# Patient Record
Sex: MALE | Race: WHITE | NOT HISPANIC OR LATINO | Employment: OTHER | URBAN - METROPOLITAN AREA
[De-identification: names, ages, dates, MRNs, and addresses within clinical notes are randomized per-mention and may not be internally consistent; named-entity substitution may affect disease eponyms.]

---

## 2018-01-03 ENCOUNTER — TRANSCRIBE ORDERS (OUTPATIENT)
Dept: ADMINISTRATIVE | Facility: HOSPITAL | Age: 63
End: 2018-01-03

## 2018-01-03 DIAGNOSIS — I71.20 THORACIC AORTIC ANEURYSM WITHOUT RUPTURE (HCC): Primary | ICD-10-CM

## 2018-01-08 ENCOUNTER — TRANSCRIBE ORDERS (OUTPATIENT)
Dept: ADMINISTRATIVE | Facility: HOSPITAL | Age: 63
End: 2018-01-08

## 2018-01-08 DIAGNOSIS — I71.21 ANEURYSM, ASCENDING AORTA (HCC): Primary | ICD-10-CM

## 2018-01-11 ENCOUNTER — HOSPITAL ENCOUNTER (OUTPATIENT)
Dept: CT IMAGING | Facility: HOSPITAL | Age: 63
Discharge: HOME | End: 2018-01-11
Attending: INTERNAL MEDICINE | Admitting: INTERNAL MEDICINE

## 2018-01-11 DIAGNOSIS — I71.21 ANEURYSM, ASCENDING AORTA (HCC): ICD-10-CM

## 2018-01-11 DIAGNOSIS — I71.20 THORACIC AORTIC ANEURYSM WITHOUT RUPTURE (HCC): ICD-10-CM

## 2018-01-11 LAB — CREAT BLDA-MCNC: 1.2 MG/DL (ref 0.6–1.3)

## 2018-01-11 PROCEDURE — 71275 CT ANGIOGRAPHY CHEST: CPT

## 2018-01-11 PROCEDURE — 82565 ASSAY OF CREATININE: CPT

## 2018-01-11 PROCEDURE — 0 IOPAMIDOL PER 1 ML: Performed by: INTERNAL MEDICINE

## 2018-01-11 RX ADMIN — IOPAMIDOL 95 ML: 755 INJECTION, SOLUTION INTRAVENOUS at 09:39

## 2018-01-29 ENCOUNTER — TRANSCRIBE ORDERS (OUTPATIENT)
Dept: ADMINISTRATIVE | Facility: HOSPITAL | Age: 63
End: 2018-01-29

## 2018-01-29 DIAGNOSIS — R93.89 ABNORMAL CHEST CT: Primary | ICD-10-CM

## 2018-04-11 ENCOUNTER — HOSPITAL ENCOUNTER (OUTPATIENT)
Dept: CT IMAGING | Facility: HOSPITAL | Age: 63
Discharge: HOME | End: 2018-04-11
Attending: INTERNAL MEDICINE | Admitting: INTERNAL MEDICINE

## 2018-04-11 DIAGNOSIS — R93.89 ABNORMAL CHEST CT: ICD-10-CM

## 2018-04-11 PROCEDURE — 71250 CT THORAX DX C-: CPT

## 2018-04-20 ENCOUNTER — TRANSCRIBE ORDERS (OUTPATIENT)
Dept: ADMINISTRATIVE | Facility: HOSPITAL | Age: 63
End: 2018-04-20

## 2018-04-20 DIAGNOSIS — R91.1 PULMONARY NODULE: Primary | ICD-10-CM

## 2018-11-05 ENCOUNTER — HOSPITAL ENCOUNTER (OUTPATIENT)
Dept: GENERAL RADIOLOGY | Facility: HOSPITAL | Age: 63
Discharge: HOME | End: 2018-11-05
Attending: INTERNAL MEDICINE | Admitting: INTERNAL MEDICINE

## 2018-11-05 DIAGNOSIS — M19.071 ARTHRITIS OF RIGHT ANKLE: ICD-10-CM

## 2018-11-05 PROCEDURE — 73610 X-RAY EXAM OF ANKLE: CPT

## 2018-11-07 ENCOUNTER — TRANSCRIBE ORDERS (OUTPATIENT)
Dept: ADMINISTRATIVE | Facility: HOSPITAL | Age: 63
End: 2018-11-07

## 2018-11-07 DIAGNOSIS — M19.90 ARTHRITIS: Primary | ICD-10-CM

## 2018-11-08 ENCOUNTER — HOSPITAL ENCOUNTER (OUTPATIENT)
Dept: GENERAL RADIOLOGY | Facility: HOSPITAL | Age: 63
Discharge: HOME | End: 2018-11-08
Attending: INTERNAL MEDICINE | Admitting: RADIOLOGY

## 2018-11-08 DIAGNOSIS — M19.90 ARTHRITIS: ICD-10-CM

## 2018-11-08 LAB
APPEARANCE FLD: ABNORMAL
COLOR FLD: ABNORMAL
CRYSTALS FLD MICRO: NORMAL
LYMPHOCYTES NFR FLD MANUAL: 70 %
MONOCYTES NFR FLD: 4 %
NEUTROPHILS NFR FLD MANUAL: 26 %
RBC # FLD AUTO: 4050 /MM3
WBC # FLD: 18 /MM3

## 2018-11-08 PROCEDURE — 87015 SPECIMEN INFECT AGNT CONCNTJ: CPT | Performed by: INTERNAL MEDICINE

## 2018-11-08 PROCEDURE — 77002 NEEDLE LOCALIZATION BY XRAY: CPT

## 2018-11-08 PROCEDURE — 89051 BODY FLUID CELL COUNT: CPT | Performed by: INTERNAL MEDICINE

## 2018-11-08 PROCEDURE — 89060 EXAM SYNOVIAL FLUID CRYSTALS: CPT | Performed by: INTERNAL MEDICINE

## 2018-11-08 PROCEDURE — 87070 CULTURE OTHR SPECIMN AEROBIC: CPT | Performed by: INTERNAL MEDICINE

## 2018-11-08 PROCEDURE — 87205 SMEAR GRAM STAIN: CPT | Performed by: INTERNAL MEDICINE

## 2018-11-08 RX ORDER — LIDOCAINE HYDROCHLORIDE 10 MG/ML
10 INJECTION, SOLUTION INFILTRATION; PERINEURAL ONCE
Status: COMPLETED | OUTPATIENT
Start: 2018-11-08 | End: 2018-11-08

## 2018-11-08 RX ADMIN — LIDOCAINE HYDROCHLORIDE 5 ML: 10 INJECTION, SOLUTION INFILTRATION; PERINEURAL at 10:58

## 2018-11-13 LAB
BACTERIA FLD CULT: NORMAL
GRAM STN SPEC: NORMAL

## 2018-12-19 ENCOUNTER — TRANSCRIBE ORDERS (OUTPATIENT)
Dept: ADMINISTRATIVE | Facility: HOSPITAL | Age: 63
End: 2018-12-19

## 2018-12-19 DIAGNOSIS — R91.1 LUNG NODULE: Primary | ICD-10-CM

## 2018-12-28 ENCOUNTER — HOSPITAL ENCOUNTER (OUTPATIENT)
Dept: CT IMAGING | Facility: HOSPITAL | Age: 63
Discharge: HOME | End: 2018-12-28
Attending: INTERNAL MEDICINE | Admitting: INTERNAL MEDICINE

## 2018-12-28 DIAGNOSIS — R91.1 LUNG NODULE: ICD-10-CM

## 2018-12-28 PROCEDURE — 71250 CT THORAX DX C-: CPT

## 2019-01-28 ENCOUNTER — TRANSCRIBE ORDERS (OUTPATIENT)
Dept: ADMINISTRATIVE | Facility: HOSPITAL | Age: 64
End: 2019-01-28

## 2019-01-28 DIAGNOSIS — R91.1 PULMONARY NODULE, LEFT: Primary | ICD-10-CM

## 2019-06-20 ENCOUNTER — HOSPITAL ENCOUNTER (OUTPATIENT)
Dept: CT IMAGING | Facility: HOSPITAL | Age: 64
Discharge: HOME | End: 2019-06-20
Admitting: INTERNAL MEDICINE

## 2019-06-20 DIAGNOSIS — R91.1 PULMONARY NODULE, LEFT: ICD-10-CM

## 2019-06-20 PROCEDURE — 71250 CT THORAX DX C-: CPT

## 2019-08-14 ENCOUNTER — APPOINTMENT (OUTPATIENT)
Dept: OTHER | Facility: HOSPITAL | Age: 64
End: 2019-08-14

## 2019-08-14 ENCOUNTER — LAB (OUTPATIENT)
Dept: LAB | Facility: HOSPITAL | Age: 64
End: 2019-08-14

## 2019-08-14 ENCOUNTER — CONSULT (OUTPATIENT)
Dept: ONCOLOGY | Facility: CLINIC | Age: 64
End: 2019-08-14

## 2019-08-14 VITALS
BODY MASS INDEX: 19.82 KG/M2 | HEIGHT: 75 IN | WEIGHT: 159.4 LBS | HEART RATE: 58 BPM | RESPIRATION RATE: 16 BRPM | OXYGEN SATURATION: 98 % | TEMPERATURE: 98.4 F | DIASTOLIC BLOOD PRESSURE: 85 MMHG | SYSTOLIC BLOOD PRESSURE: 129 MMHG

## 2019-08-14 DIAGNOSIS — R79.89 ELEVATED FERRITIN: ICD-10-CM

## 2019-08-14 DIAGNOSIS — D69.6 THROMBOCYTOPENIA (HCC): Primary | ICD-10-CM

## 2019-08-14 LAB
BASOPHILS # BLD AUTO: 0.07 10*3/MM3 (ref 0–0.2)
BASOPHILS NFR BLD AUTO: 1.3 % (ref 0–1.5)
DEPRECATED RDW RBC AUTO: 44.6 FL (ref 37–54)
EOSINOPHIL # BLD AUTO: 0.34 10*3/MM3 (ref 0–0.4)
EOSINOPHIL NFR BLD AUTO: 6.4 % (ref 0.3–6.2)
ERYTHROCYTE [DISTWIDTH] IN BLOOD BY AUTOMATED COUNT: 12.6 % (ref 12.3–15.4)
FERRITIN SERPL-MCNC: 929 NG/ML (ref 30–400)
HCT VFR BLD AUTO: 46.5 % (ref 37.5–51)
HGB BLD-MCNC: 16.2 G/DL (ref 13–17.7)
HGB RETIC QN AUTO: 38.1 PG (ref 29.8–36.1)
IMM GRANULOCYTES # BLD AUTO: 0.05 10*3/MM3 (ref 0–0.05)
IMM GRANULOCYTES NFR BLD AUTO: 0.9 % (ref 0–0.5)
IMM RETICS NFR: 4.8 % (ref 3–15.8)
IRON 24H UR-MRATE: 201 MCG/DL (ref 59–158)
IRON SATN MFR SERPL: 83 % (ref 14–48)
LYMPHOCYTES # BLD AUTO: 1.89 10*3/MM3 (ref 0.7–3.1)
LYMPHOCYTES NFR BLD AUTO: 35.8 % (ref 19.6–45.3)
MCH RBC QN AUTO: 33.2 PG (ref 26.6–33)
MCHC RBC AUTO-ENTMCNC: 34.8 G/DL (ref 31.5–35.7)
MCV RBC AUTO: 95.3 FL (ref 79–97)
MONOCYTES # BLD AUTO: 0.52 10*3/MM3 (ref 0.1–0.9)
MONOCYTES NFR BLD AUTO: 9.8 % (ref 5–12)
NEUTROPHILS # BLD AUTO: 2.41 10*3/MM3 (ref 1.7–7)
NEUTROPHILS NFR BLD AUTO: 45.8 % (ref 42.7–76)
NRBC BLD AUTO-RTO: 0 /100 WBC (ref 0–0.2)
PLATELET # BLD AUTO: 124 10*3/MM3 (ref 140–450)
PLATELETS.RETICULATED NFR BLD AUTO: 8.7 % (ref 0.9–6.5)
PMV BLD AUTO: 11.4 FL (ref 6–12)
RBC # BLD AUTO: 4.88 10*6/MM3 (ref 4.14–5.8)
RETICS/RBC NFR AUTO: 0.98 % (ref 0.7–1.9)
TIBC SERPL-MCNC: 242 MCG/DL (ref 249–505)
TRANSFERRIN SERPL-MCNC: 173 MG/DL (ref 200–360)
VIT B12 BLD-MCNC: 304 PG/ML (ref 211–946)
WBC NRBC COR # BLD: 5.28 10*3/MM3 (ref 3.4–10.8)

## 2019-08-14 PROCEDURE — 85046 RETICYTE/HGB CONCENTRATE: CPT | Performed by: INTERNAL MEDICINE

## 2019-08-14 PROCEDURE — 85055 RETICULATED PLATELET ASSAY: CPT | Performed by: INTERNAL MEDICINE

## 2019-08-14 PROCEDURE — 36415 COLL VENOUS BLD VENIPUNCTURE: CPT | Performed by: INTERNAL MEDICINE

## 2019-08-14 PROCEDURE — 99205 OFFICE O/P NEW HI 60 MIN: CPT | Performed by: INTERNAL MEDICINE

## 2019-08-14 PROCEDURE — 82728 ASSAY OF FERRITIN: CPT | Performed by: INTERNAL MEDICINE

## 2019-08-14 PROCEDURE — 84466 ASSAY OF TRANSFERRIN: CPT | Performed by: INTERNAL MEDICINE

## 2019-08-14 PROCEDURE — 83540 ASSAY OF IRON: CPT | Performed by: INTERNAL MEDICINE

## 2019-08-14 PROCEDURE — 85025 COMPLETE CBC W/AUTO DIFF WBC: CPT | Performed by: INTERNAL MEDICINE

## 2019-08-14 PROCEDURE — 82607 VITAMIN B-12: CPT | Performed by: INTERNAL MEDICINE

## 2019-08-14 RX ORDER — METOPROLOL SUCCINATE 50 MG/1
50 TABLET, EXTENDED RELEASE ORAL DAILY
Refills: 1 | COMMUNITY
Start: 2019-05-15

## 2019-08-14 NOTE — PROGRESS NOTES
REFERRING PROVIDER:    Wendi Hanson MD  [address] [address]    REASON FOR CONSULTATION:    1.  Thrombocytopenia  2.  Elevated ferritin    HISTORY OF PRESENT ILLNESS:  Pablito Bergman is a 64 y.o. male who is referred today for further evaluation of thrombocytopenia and a high ferritin level.    He has a history of smoking.  Based on this, he has had recurrent CT imaging of his chest.  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  CT imaging of his chest from 6/20/2019 showed that his liver is more dense than his spleen.  This was previously present as well.  Based on this, there was some concern for the possibility of liver disease and hemochromatosis.    Labs on 6/18/2019 showed a platelet count of 115,000, hemoglobin 16.0 hematocrit 48.1%, and white blood cells 4.1 with a normal differential.  As of 6/27/2019 his ferritin was 866 with an iron of 135 and 63% iron saturation.        Previously, on 11/5/2018 his platelets were 117,000 with a hemoglobin of 16.0 and hematocrit 46.1%.    He is asymptomatic.  He continues to smoke.  He denies significant alcohol use.  He denies significant pain or inflammation.  He denies any family history of hemochromatosis.    Past Medical History:   Diagnosis Date   • Headache    • Hypertension    • Kidney stone    • Left inguinal hernia    • Thoracic aortic aneurysm (CMS/HCC)        Past Surgical History:   Procedure Laterality Date   • COLONOSCOPY     • INGUINAL HERNIA REPAIR Left 2018   • KIDNEY STONE SURGERY     • KNEE SURGERY Bilateral 1987   • SKIN BIOPSY         SOCIAL HISTORY:   reports that he has been smoking cigarettes.  He has been smoking about 0.75 packs per day. He has never used smokeless tobacco. He reports that he drinks alcohol. His drug history is not on file.    FAMILY HISTORY:  family history includes Cancer in his maternal grandmother; Prostate cancer in his maternal grandfather.    ALLERGIES:  Allergies   Allergen  "Reactions   • Other Unknown (See Comments)     Red pepper headache reaction       MEDICATIONS:  The medication list has been reviewed with the patient by the medical assistant, and the list has been updated in the electronic medical record, which I reviewed.  Medication dosages and frequencies were confirmed to be accurate.    Review of Systems   Constitutional: Negative for appetite change, chills, fatigue, fever and unexpected weight change.   HENT: Negative for congestion, dental problem, ear pain, hearing loss, mouth sores, nosebleeds, postnasal drip, rhinorrhea, tinnitus and trouble swallowing.    Eyes: Negative.    Respiratory: Negative for cough, chest tightness, shortness of breath, wheezing and stridor.    Cardiovascular: Negative for chest pain, palpitations and leg swelling.   Gastrointestinal: Negative for abdominal distention, abdominal pain, blood in stool, constipation, diarrhea, nausea and vomiting.   Endocrine: Negative.    Genitourinary: Negative for decreased urine volume, difficulty urinating, dysuria, flank pain, frequency, hematuria, scrotal swelling, testicular pain and urgency.   Musculoskeletal: Negative for arthralgias, back pain and joint swelling.   Allergic/Immunologic: Negative.    Neurological: Negative for dizziness, seizures, syncope, weakness, light-headedness, numbness and headaches.   Hematological: Negative for adenopathy. Does not bruise/bleed easily.   Psychiatric/Behavioral: Negative for confusion and sleep disturbance. The patient is not nervous/anxious.    All other systems reviewed and are negative.      Vitals:    08/14/19 1450   BP: 129/85   Pulse: 58   Resp: 16   Temp: 98.4 °F (36.9 °C)   TempSrc: Oral   SpO2: 98%   Weight: 72.3 kg (159 lb 6.4 oz)   Height: 190 cm (74.8\")   PainSc: 0-No pain  Comment: thrombocytopenia       Physical Exam   Constitutional: He is oriented to person, place, and time. He appears well-developed and well-nourished. No distress.   HENT:   Head: " Normocephalic and atraumatic. Hair is normal.   Mouth/Throat: Oropharynx is clear and moist.   Eyes: Conjunctivae, EOM and lids are normal. Pupils are equal.   Neck: Neck supple. No JVD present. No thyroid mass and no thyromegaly present.   Cardiovascular: Normal rate and regular rhythm. Exam reveals no gallop and no friction rub.   No murmur heard.  Pulmonary/Chest: Effort normal and breath sounds normal. No respiratory distress. He has no wheezes. He has no rales.   Abdominal: Soft. He exhibits no distension and no mass. There is no splenomegaly or hepatomegaly. There is no tenderness. There is no guarding.   Musculoskeletal: Normal range of motion. He exhibits no edema, tenderness or deformity.   Lymphadenopathy:     He has no cervical adenopathy.     He has no axillary adenopathy.        Right: No inguinal and no supraclavicular adenopathy present.        Left: No inguinal and no supraclavicular adenopathy present.   Neurological: He is alert and oriented to person, place, and time. He has normal strength.   Skin: Skin is warm and dry. No rash noted.   Psychiatric: He has a normal mood and affect. His behavior is normal. Judgment and thought content normal. Cognition and memory are normal.   Nursing note and vitals reviewed.      DIAGNOSTIC DATA:  Results for orders placed or performed in visit on 08/14/19   CBC Auto Differential   Result Value Ref Range    WBC 5.28 3.40 - 10.80 10*3/mm3    RBC 4.88 4.14 - 5.80 10*6/mm3    Hemoglobin 16.2 13.0 - 17.7 g/dL    Hematocrit 46.5 37.5 - 51.0 %    MCV 95.3 79.0 - 97.0 fL    MCH 33.2 (H) 26.6 - 33.0 pg    MCHC 34.8 31.5 - 35.7 g/dL    RDW 12.6 12.3 - 15.4 %    RDW-SD 44.6 37.0 - 54.0 fl    MPV 11.4 6.0 - 12.0 fL    Platelets 124 (L) 140 - 450 10*3/mm3    Neutrophil % 45.8 42.7 - 76.0 %    Lymphocyte % 35.8 19.6 - 45.3 %    Monocyte % 9.8 5.0 - 12.0 %    Eosinophil % 6.4 (H) 0.3 - 6.2 %    Basophil % 1.3 0.0 - 1.5 %    Immature Grans % 0.9 (H) 0.0 - 0.5 %    Neutrophils,  Absolute 2.41 1.70 - 7.00 10*3/mm3    Lymphocytes, Absolute 1.89 0.70 - 3.10 10*3/mm3    Monocytes, Absolute 0.52 0.10 - 0.90 10*3/mm3    Eosinophils, Absolute 0.34 0.00 - 0.40 10*3/mm3    Basophils, Absolute 0.07 0.00 - 0.20 10*3/mm3    Immature Grans, Absolute 0.05 0.00 - 0.05 10*3/mm3    nRBC 0.0 0.0 - 0.2 /100 WBC       IMAGING:    I personally reviewed his peripheral blood smear.  I do not visualize any platelet clumping.  Platelets are adequate in number and normal in morphology.  Red cells appear normal.  White cells are normal in morphology.    ASSESSMENT:  This is a 64 y.o. male with:  1.  Thrombocytopenia: This has been chronic.  I do not see any evidence for platelet clumping on his peripheral blood smear.  He is asymptomatic.  We will check an immature platelet fraction and a vitamin B12 level.    2.  Elevated ferritin:  As of 6/27/2019 his ferritin was 866 with an iron of 135 and 63% iron saturation.  There is no family history of this, but he certainly could have a mild form of hereditary hemochromatosis.  We will check an HFE gene mutation analysis today and repeat his ferritin and iron studies.  I explained to him today a little bit about hemochromatosis.  If he does have evidence for this, we will plan to treat with therapeutic phlebotomy.  He seems to have a mild form of this if he does have hemochromatosis.    PLAN:   1.  Labs today including an immature platelet fraction, vitamin B12 level, ferritin, iron profile, reticulocyte count, and HFE gene mutation analysis.    2.  I will see him back in the next few weeks for follow-up with a CBC and an appointment for for therapeutic phlebotomy if appropriate.

## 2019-08-19 LAB — HFE GENE MUT ANL BLD/T: NORMAL

## 2019-09-11 ENCOUNTER — OFFICE VISIT (OUTPATIENT)
Dept: ONCOLOGY | Facility: CLINIC | Age: 64
End: 2019-09-11

## 2019-09-11 ENCOUNTER — INFUSION (OUTPATIENT)
Dept: ONCOLOGY | Facility: HOSPITAL | Age: 64
End: 2019-09-11

## 2019-09-11 ENCOUNTER — LAB (OUTPATIENT)
Dept: LAB | Facility: HOSPITAL | Age: 64
End: 2019-09-11

## 2019-09-11 VITALS
RESPIRATION RATE: 16 BRPM | HEART RATE: 60 BPM | DIASTOLIC BLOOD PRESSURE: 96 MMHG | HEIGHT: 75 IN | OXYGEN SATURATION: 97 % | TEMPERATURE: 97.8 F | WEIGHT: 165.7 LBS | SYSTOLIC BLOOD PRESSURE: 147 MMHG | BODY MASS INDEX: 20.6 KG/M2

## 2019-09-11 VITALS — DIASTOLIC BLOOD PRESSURE: 88 MMHG | HEART RATE: 57 BPM | SYSTOLIC BLOOD PRESSURE: 138 MMHG

## 2019-09-11 DIAGNOSIS — D69.6 THROMBOCYTOPENIA (HCC): ICD-10-CM

## 2019-09-11 DIAGNOSIS — R79.89 ELEVATED FERRITIN: ICD-10-CM

## 2019-09-11 DIAGNOSIS — E83.110 HEREDITARY HEMOCHROMATOSIS (HCC): Primary | ICD-10-CM

## 2019-09-11 LAB
BASOPHILS # BLD AUTO: 0.07 10*3/MM3 (ref 0–0.2)
BASOPHILS NFR BLD AUTO: 1.4 % (ref 0–1.5)
DEPRECATED RDW RBC AUTO: 45.8 FL (ref 37–54)
EOSINOPHIL # BLD AUTO: 0.37 10*3/MM3 (ref 0–0.4)
EOSINOPHIL NFR BLD AUTO: 7.6 % (ref 0.3–6.2)
ERYTHROCYTE [DISTWIDTH] IN BLOOD BY AUTOMATED COUNT: 12.7 % (ref 12.3–15.4)
HCT VFR BLD AUTO: 47.3 % (ref 37.5–51)
HGB BLD-MCNC: 16.1 G/DL (ref 13–17.7)
IMM GRANULOCYTES # BLD AUTO: 0.05 10*3/MM3 (ref 0–0.05)
IMM GRANULOCYTES NFR BLD AUTO: 1 % (ref 0–0.5)
LYMPHOCYTES # BLD AUTO: 1.38 10*3/MM3 (ref 0.7–3.1)
LYMPHOCYTES NFR BLD AUTO: 28.5 % (ref 19.6–45.3)
MCH RBC QN AUTO: 33.1 PG (ref 26.6–33)
MCHC RBC AUTO-ENTMCNC: 34 G/DL (ref 31.5–35.7)
MCV RBC AUTO: 97.1 FL (ref 79–97)
MONOCYTES # BLD AUTO: 0.49 10*3/MM3 (ref 0.1–0.9)
MONOCYTES NFR BLD AUTO: 10.1 % (ref 5–12)
NEUTROPHILS # BLD AUTO: 2.49 10*3/MM3 (ref 1.7–7)
NEUTROPHILS NFR BLD AUTO: 51.4 % (ref 42.7–76)
NRBC BLD AUTO-RTO: 0 /100 WBC (ref 0–0.2)
PLATELET # BLD AUTO: 105 10*3/MM3 (ref 140–450)
PMV BLD AUTO: 11.1 FL (ref 6–12)
RBC # BLD AUTO: 4.87 10*6/MM3 (ref 4.14–5.8)
WBC NRBC COR # BLD: 4.85 10*3/MM3 (ref 3.4–10.8)

## 2019-09-11 PROCEDURE — 85025 COMPLETE CBC W/AUTO DIFF WBC: CPT | Performed by: INTERNAL MEDICINE

## 2019-09-11 PROCEDURE — 99214 OFFICE O/P EST MOD 30 MIN: CPT | Performed by: INTERNAL MEDICINE

## 2019-09-11 PROCEDURE — 36415 COLL VENOUS BLD VENIPUNCTURE: CPT | Performed by: INTERNAL MEDICINE

## 2019-09-11 RX ORDER — SODIUM CHLORIDE 9 MG/ML
250 INJECTION, SOLUTION INTRAVENOUS ONCE
Status: CANCELLED
Start: 2019-09-18 | End: 2019-09-18

## 2019-09-11 RX ORDER — SODIUM CHLORIDE 9 MG/ML
250 INJECTION, SOLUTION INTRAVENOUS ONCE
Status: DISCONTINUED | OUTPATIENT
Start: 2019-09-11 | End: 2019-09-11 | Stop reason: HOSPADM

## 2019-09-11 NOTE — PROGRESS NOTES
[facility] GROUP OUTPATIENT FOLLOW UP CLINIC VISIT    REASON FOR FOLLOW-UP:    1.  Thrombocytopenia  2.  Elevated ferritin with newly diagnosed hereditary hemochromatosis on 8/14/2019.  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  Baseline ferritin in the office on 8/14/2019 is 929.       HISTORY OF PRESENT ILLNESS:  Pablito Bergman is a 64 y.o. male who returns today for follow up of the above issue.      He continues to feel well.  No new problems today.        HEMATOLOGIC HISTORY:  He has a history of smoking.  Based on this, he has had recurrent CT imaging of his chest.  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  CT imaging of his chest from 6/20/2019 showed that his liver is more dense than his spleen.  This was previously present as well.  Based on this, there was some concern for the possibility of liver disease and hemochromatosis.     Labs on 6/18/2019 showed a platelet count of 115,000, hemoglobin 16.0 hematocrit 48.1%, and white blood cells 4.1 with a normal differential.  As of 6/27/2019 his ferritin was 866 with an iron of 135 and 63% iron saturation.         Previously, on 11/5/2018 his platelets were 117,000 with a hemoglobin of 16.0 and hematocrit 46.1%.     He is asymptomatic.  He continues to smoke.  He denies significant alcohol use.  He denies significant pain or inflammation.  He denies any family history of hemochromatosis.    ALLERGIES:  Allergies   Allergen Reactions   • Other Unknown (See Comments)     Red pepper headache reaction       MEDICATIONS:  The medication list has been reviewed with the patient by the medical assistant, and the list has been updated in the electronic medical record, which I reviewed.  Medication dosages and frequencies were confirmed to be accurate.    REVIEW OF SYSTEMS:  PAIN:  See Vital Signs below.  GENERAL:  No fevers, chills, night sweats, or unintended weight loss.  SKIN:  No rashes or non-healing lesions.  HEME/LYMPH:  No  "abnormal bleeding.  No palpable lymphadenopathy.  EYES:  No vision changes or diplopia.  ENT:  No sore throat or difficulty swallowing.  RESPIRATORY:  No cough, shortness of breath, hemoptysis, or wheezing.  CARDIOVASCULAR:  No chest pain, palpitations, orthopnea, or dyspnea on exertion.  GASTROINTESTINAL:  No abdominal pain, nausea, vomiting, constipation, diarrhea, melena, or hematochezia.  GENITOURINARY:  No dysuria or hematuria.  MUSCULOSKELETAL:  No joint pain, swelling, or erythema.  NEUROLOGIC:  No dizziness, loss of consciousness, or seizures.  PSYCHIATRIC:  No depression, anxiety, or mood changes.    Vitals:    09/11/19 0846   BP: 147/96   Pulse: 60   Resp: 16   Temp: 97.8 °F (36.6 °C)   TempSrc: Oral   SpO2: 97%   Weight: 75.2 kg (165 lb 11.2 oz)   Height: 190 cm (74.8\")   PainSc: 0-No pain  Comment: skin cancer       PHYSICAL EXAMINATION:  GENERAL:  Well-developed well-nourished male; awake, alert and oriented, in no acute distress.  SKIN:  Warm and dry, without rashes, purpura, or petechiae.  HEAD:  Normocephalic, atraumatic.  EARS:  Hearing intact.  NOSE:  Septum midline.  No excoriations or nasal discharge.  MOUTH:  No stomatitis or ulcers.  Lips are normal.  THROAT:  Oropharynx without lesions or exudates.  LYMPHATICS:  No cervical, supraclavicular, or axillary lymphadenopathy.  CHEST:  Lungs are clear to auscultation bilaterally.  No wheezes, rales, or rhonchi.  HEART:  Regular rate; normal rhythm.  No murmurs, gallops or rubs.  EXTREMITIES:  No clubbing, cyanosis, or edema.  NEUROLOGICAL:  No focal neurologic deficits.    DIAGNOSTIC DATA:  Results for orders placed or performed in visit on 09/11/19   CBC Auto Differential   Result Value Ref Range    WBC 4.85 3.40 - 10.80 10*3/mm3    RBC 4.87 4.14 - 5.80 10*6/mm3    Hemoglobin 16.1 13.0 - 17.7 g/dL    Hematocrit 47.3 37.5 - 51.0 %    MCV 97.1 (H) 79.0 - 97.0 fL    MCH 33.1 (H) 26.6 - 33.0 pg    MCHC 34.0 31.5 - 35.7 g/dL    RDW 12.7 12.3 - 15.4 %    " RDW-SD 45.8 37.0 - 54.0 fl    MPV 11.1 6.0 - 12.0 fL    Platelets 105 (L) 140 - 450 10*3/mm3    Neutrophil % 51.4 42.7 - 76.0 %    Lymphocyte % 28.5 19.6 - 45.3 %    Monocyte % 10.1 5.0 - 12.0 %    Eosinophil % 7.6 (H) 0.3 - 6.2 %    Basophil % 1.4 0.0 - 1.5 %    Immature Grans % 1.0 (H) 0.0 - 0.5 %    Neutrophils, Absolute 2.49 1.70 - 7.00 10*3/mm3    Lymphocytes, Absolute 1.38 0.70 - 3.10 10*3/mm3    Monocytes, Absolute 0.49 0.10 - 0.90 10*3/mm3    Eosinophils, Absolute 0.37 0.00 - 0.40 10*3/mm3    Basophils, Absolute 0.07 0.00 - 0.20 10*3/mm3    Immature Grans, Absolute 0.05 0.00 - 0.05 10*3/mm3    nRBC 0.0 0.0 - 0.2 /100 WBC       IMAGING:     ASSESSMENT:  This is a 64 y.o. male with:  1. Thrombocytopenia: This has been chronic.  I do not see any evidence for platelet clumping on his peripheral blood smear.  He is asymptomatic.  His IPF is slightly elevated at 8.7%.  Vitamin B12 normal at 304.  Continue to monitor.  2. Hereditary hemochromatosis:  He is a homozygote for the HFE gene C282Y mutation.  Baseline ferritin in the office on 8/14/2019 is 929.  I gave him a copy of the lab work today and educated him regarding hemochromatosis.  I advised him that some people with his genotype are more affected than others.  Considering that his ferritin is only in the 900s at age 64 I do not think he has been accumulating iron that rapidly throughout his life.  I advised him that we need to get rid of the iron with therapeutic phlebotomy.  We will go ahead and schedule this monthly for now and follow his CBCs and ferritin levels.  I advised him that it is impossible to predict how quickly his ferritin might come down but I am hopeful that it will come down rapidly even with monthly phlebotomy.  If not, we will need to increase the frequency.  At some point we may obtain MRI imaging of his liver to look for iron deposition and we will need to check an alpha-fetoprotein level at some point as well.         PLAN:  1.   Therapeutic phlebotomy today and monthly with a CBC and ferritin at each visit.  I will see him back after the first of the year.  We will adjust his phlebotomy schedule appropriately based on his ferritin levels.  Goal ferritin is around 50.  We will monitor for anemia.

## 2019-10-09 ENCOUNTER — INFUSION (OUTPATIENT)
Dept: ONCOLOGY | Facility: HOSPITAL | Age: 64
End: 2019-10-09

## 2019-10-09 ENCOUNTER — LAB (OUTPATIENT)
Dept: LAB | Facility: HOSPITAL | Age: 64
End: 2019-10-09

## 2019-10-09 VITALS
DIASTOLIC BLOOD PRESSURE: 77 MMHG | HEART RATE: 80 BPM | SYSTOLIC BLOOD PRESSURE: 119 MMHG | TEMPERATURE: 98.1 F | BODY MASS INDEX: 20.23 KG/M2 | OXYGEN SATURATION: 99 % | WEIGHT: 161 LBS

## 2019-10-09 DIAGNOSIS — E83.110 HEREDITARY HEMOCHROMATOSIS (HCC): ICD-10-CM

## 2019-10-09 DIAGNOSIS — E83.110 HEREDITARY HEMOCHROMATOSIS (HCC): Primary | ICD-10-CM

## 2019-10-09 DIAGNOSIS — D69.6 THROMBOCYTOPENIA (HCC): ICD-10-CM

## 2019-10-09 LAB
BASOPHILS # BLD AUTO: 0.07 10*3/MM3 (ref 0–0.2)
BASOPHILS NFR BLD AUTO: 1.2 % (ref 0–1.5)
DEPRECATED RDW RBC AUTO: 47.8 FL (ref 37–54)
EOSINOPHIL # BLD AUTO: 0.13 10*3/MM3 (ref 0–0.4)
EOSINOPHIL NFR BLD AUTO: 2.3 % (ref 0.3–6.2)
ERYTHROCYTE [DISTWIDTH] IN BLOOD BY AUTOMATED COUNT: 12.6 % (ref 12.3–15.4)
FERRITIN SERPL-MCNC: 1063.6 NG/ML (ref 30–400)
HCT VFR BLD AUTO: 47.9 % (ref 37.5–51)
HGB BLD-MCNC: 15.8 G/DL (ref 13–17.7)
HGB RETIC QN AUTO: 37.3 PG (ref 29.8–36.1)
IMM GRANULOCYTES # BLD AUTO: 0.01 10*3/MM3 (ref 0–0.05)
IMM GRANULOCYTES NFR BLD AUTO: 0.2 % (ref 0–0.5)
IMM RETICS NFR: 3.2 % (ref 3–15.8)
LYMPHOCYTES # BLD AUTO: 1.2 10*3/MM3 (ref 0.7–3.1)
LYMPHOCYTES NFR BLD AUTO: 20.9 % (ref 19.6–45.3)
MCH RBC QN AUTO: 33.1 PG (ref 26.6–33)
MCHC RBC AUTO-ENTMCNC: 33 G/DL (ref 31.5–35.7)
MCV RBC AUTO: 100.4 FL (ref 79–97)
MONOCYTES # BLD AUTO: 0.59 10*3/MM3 (ref 0.1–0.9)
MONOCYTES NFR BLD AUTO: 10.3 % (ref 5–12)
NEUTROPHILS # BLD AUTO: 3.73 10*3/MM3 (ref 1.7–7)
NEUTROPHILS NFR BLD AUTO: 65.1 % (ref 42.7–76)
NRBC BLD AUTO-RTO: 0 /100 WBC (ref 0–0.2)
PLATELET # BLD AUTO: 160 10*3/MM3 (ref 140–450)
PMV BLD AUTO: 10.1 FL (ref 6–12)
RBC # BLD AUTO: 4.77 10*6/MM3 (ref 4.14–5.8)
RETICS/RBC NFR AUTO: 1.12 % (ref 0.7–1.9)
WBC NRBC COR # BLD: 5.73 10*3/MM3 (ref 3.4–10.8)

## 2019-10-09 PROCEDURE — 36415 COLL VENOUS BLD VENIPUNCTURE: CPT

## 2019-10-09 PROCEDURE — 85046 RETICYTE/HGB CONCENTRATE: CPT

## 2019-10-09 PROCEDURE — 85025 COMPLETE CBC W/AUTO DIFF WBC: CPT

## 2019-10-09 PROCEDURE — 99195 PHLEBOTOMY: CPT | Performed by: INTERNAL MEDICINE

## 2019-10-09 PROCEDURE — 82728 ASSAY OF FERRITIN: CPT

## 2019-10-09 RX ORDER — SODIUM CHLORIDE 9 MG/ML
250 INJECTION, SOLUTION INTRAVENOUS ONCE
Status: CANCELLED
Start: 2019-10-09 | End: 2019-10-09

## 2019-11-06 ENCOUNTER — INFUSION (OUTPATIENT)
Dept: ONCOLOGY | Facility: HOSPITAL | Age: 64
End: 2019-11-06

## 2019-11-06 ENCOUNTER — LAB (OUTPATIENT)
Dept: LAB | Facility: HOSPITAL | Age: 64
End: 2019-11-06

## 2019-11-06 VITALS
HEART RATE: 58 BPM | WEIGHT: 161 LBS | TEMPERATURE: 97.7 F | DIASTOLIC BLOOD PRESSURE: 80 MMHG | SYSTOLIC BLOOD PRESSURE: 118 MMHG | BODY MASS INDEX: 20.23 KG/M2 | OXYGEN SATURATION: 98 %

## 2019-11-06 DIAGNOSIS — E83.110 HEREDITARY HEMOCHROMATOSIS (HCC): Primary | ICD-10-CM

## 2019-11-06 DIAGNOSIS — E83.110 HEREDITARY HEMOCHROMATOSIS (HCC): ICD-10-CM

## 2019-11-06 DIAGNOSIS — D69.6 THROMBOCYTOPENIA (HCC): ICD-10-CM

## 2019-11-06 LAB
BASOPHILS # BLD AUTO: 0.08 10*3/MM3 (ref 0–0.2)
BASOPHILS NFR BLD AUTO: 1.4 % (ref 0–1.5)
DEPRECATED RDW RBC AUTO: 46.7 FL (ref 37–54)
EOSINOPHIL # BLD AUTO: 0.29 10*3/MM3 (ref 0–0.4)
EOSINOPHIL NFR BLD AUTO: 5.2 % (ref 0.3–6.2)
ERYTHROCYTE [DISTWIDTH] IN BLOOD BY AUTOMATED COUNT: 12.4 % (ref 12.3–15.4)
FERRITIN SERPL-MCNC: 975.6 NG/ML (ref 30–400)
HCT VFR BLD AUTO: 47.8 % (ref 37.5–51)
HGB BLD-MCNC: 15.5 G/DL (ref 13–17.7)
HGB RETIC QN AUTO: 37.3 PG (ref 29.8–36.1)
IMM GRANULOCYTES # BLD AUTO: 0.01 10*3/MM3 (ref 0–0.05)
IMM GRANULOCYTES NFR BLD AUTO: 0.2 % (ref 0–0.5)
IMM RETICS NFR: 3.8 % (ref 3–15.8)
LYMPHOCYTES # BLD AUTO: 1.49 10*3/MM3 (ref 0.7–3.1)
LYMPHOCYTES NFR BLD AUTO: 26.5 % (ref 19.6–45.3)
MCH RBC QN AUTO: 33 PG (ref 26.6–33)
MCHC RBC AUTO-ENTMCNC: 32.4 G/DL (ref 31.5–35.7)
MCV RBC AUTO: 101.9 FL (ref 79–97)
MONOCYTES # BLD AUTO: 0.54 10*3/MM3 (ref 0.1–0.9)
MONOCYTES NFR BLD AUTO: 9.6 % (ref 5–12)
NEUTROPHILS # BLD AUTO: 3.22 10*3/MM3 (ref 1.7–7)
NEUTROPHILS NFR BLD AUTO: 57.1 % (ref 42.7–76)
NRBC BLD AUTO-RTO: 0 /100 WBC (ref 0–0.2)
PLATELET # BLD AUTO: 116 10*3/MM3 (ref 140–450)
PMV BLD AUTO: 10.6 FL (ref 6–12)
RBC # BLD AUTO: 4.69 10*6/MM3 (ref 4.14–5.8)
RETICS/RBC NFR AUTO: 0.94 % (ref 0.7–1.9)
WBC NRBC COR # BLD: 5.63 10*3/MM3 (ref 3.4–10.8)

## 2019-11-06 PROCEDURE — 99195 PHLEBOTOMY: CPT | Performed by: INTERNAL MEDICINE

## 2019-11-06 PROCEDURE — 36415 COLL VENOUS BLD VENIPUNCTURE: CPT

## 2019-11-06 PROCEDURE — 85046 RETICYTE/HGB CONCENTRATE: CPT

## 2019-11-06 PROCEDURE — 85025 COMPLETE CBC W/AUTO DIFF WBC: CPT

## 2019-11-06 PROCEDURE — 82728 ASSAY OF FERRITIN: CPT

## 2019-11-06 RX ORDER — SODIUM CHLORIDE 9 MG/ML
250 INJECTION, SOLUTION INTRAVENOUS ONCE
Status: CANCELLED
Start: 2019-11-06 | End: 2019-11-06

## 2019-12-04 ENCOUNTER — INFUSION (OUTPATIENT)
Dept: ONCOLOGY | Facility: HOSPITAL | Age: 64
End: 2019-12-04

## 2019-12-04 ENCOUNTER — LAB (OUTPATIENT)
Dept: LAB | Facility: HOSPITAL | Age: 64
End: 2019-12-04

## 2019-12-04 VITALS
WEIGHT: 167 LBS | TEMPERATURE: 98 F | OXYGEN SATURATION: 97 % | HEART RATE: 71 BPM | DIASTOLIC BLOOD PRESSURE: 93 MMHG | SYSTOLIC BLOOD PRESSURE: 131 MMHG | BODY MASS INDEX: 20.98 KG/M2

## 2019-12-04 DIAGNOSIS — E83.110 HEREDITARY HEMOCHROMATOSIS (HCC): ICD-10-CM

## 2019-12-04 DIAGNOSIS — D69.6 THROMBOCYTOPENIA (HCC): ICD-10-CM

## 2019-12-04 DIAGNOSIS — E83.110 HEREDITARY HEMOCHROMATOSIS (HCC): Primary | ICD-10-CM

## 2019-12-04 LAB
BASOPHILS # BLD AUTO: 0.07 10*3/MM3 (ref 0–0.2)
BASOPHILS NFR BLD AUTO: 1.4 % (ref 0–1.5)
DEPRECATED RDW RBC AUTO: 47.5 FL (ref 37–54)
EOSINOPHIL # BLD AUTO: 0.22 10*3/MM3 (ref 0–0.4)
EOSINOPHIL NFR BLD AUTO: 4.4 % (ref 0.3–6.2)
ERYTHROCYTE [DISTWIDTH] IN BLOOD BY AUTOMATED COUNT: 12.4 % (ref 12.3–15.4)
FERRITIN SERPL-MCNC: 887 NG/ML (ref 30–400)
HCT VFR BLD AUTO: 50 % (ref 37.5–51)
HGB BLD-MCNC: 16.4 G/DL (ref 13–17.7)
HGB RETIC QN AUTO: 38.1 PG (ref 29.8–36.1)
IMM GRANULOCYTES # BLD AUTO: 0.01 10*3/MM3 (ref 0–0.05)
IMM GRANULOCYTES NFR BLD AUTO: 0.2 % (ref 0–0.5)
IMM RETICS NFR: 4.7 % (ref 3–15.8)
LYMPHOCYTES # BLD AUTO: 1.06 10*3/MM3 (ref 0.7–3.1)
LYMPHOCYTES NFR BLD AUTO: 21.3 % (ref 19.6–45.3)
MCH RBC QN AUTO: 33.5 PG (ref 26.6–33)
MCHC RBC AUTO-ENTMCNC: 32.8 G/DL (ref 31.5–35.7)
MCV RBC AUTO: 102.2 FL (ref 79–97)
MONOCYTES # BLD AUTO: 0.47 10*3/MM3 (ref 0.1–0.9)
MONOCYTES NFR BLD AUTO: 9.5 % (ref 5–12)
NEUTROPHILS # BLD AUTO: 3.14 10*3/MM3 (ref 1.7–7)
NEUTROPHILS NFR BLD AUTO: 63.2 % (ref 42.7–76)
NRBC BLD AUTO-RTO: 0 /100 WBC (ref 0–0.2)
PLATELET # BLD AUTO: 113 10*3/MM3 (ref 140–450)
PMV BLD AUTO: 10.9 FL (ref 6–12)
RBC # BLD AUTO: 4.89 10*6/MM3 (ref 4.14–5.8)
RETICS/RBC NFR AUTO: 1.08 % (ref 0.7–1.9)
WBC NRBC COR # BLD: 4.97 10*3/MM3 (ref 3.4–10.8)

## 2019-12-04 PROCEDURE — 85025 COMPLETE CBC W/AUTO DIFF WBC: CPT

## 2019-12-04 PROCEDURE — 36415 COLL VENOUS BLD VENIPUNCTURE: CPT

## 2019-12-04 PROCEDURE — 82728 ASSAY OF FERRITIN: CPT

## 2019-12-04 PROCEDURE — 99195 PHLEBOTOMY: CPT | Performed by: INTERNAL MEDICINE

## 2019-12-04 PROCEDURE — 85046 RETICYTE/HGB CONCENTRATE: CPT

## 2019-12-04 RX ORDER — SODIUM CHLORIDE 9 MG/ML
250 INJECTION, SOLUTION INTRAVENOUS ONCE
Status: CANCELLED
Start: 2019-12-04 | End: 2019-12-04

## 2020-01-07 DIAGNOSIS — E83.110 HEREDITARY HEMOCHROMATOSIS (HCC): Primary | ICD-10-CM

## 2020-01-08 ENCOUNTER — TELEPHONE (OUTPATIENT)
Dept: ONCOLOGY | Facility: CLINIC | Age: 65
End: 2020-01-08

## 2020-01-08 ENCOUNTER — LAB (OUTPATIENT)
Dept: LAB | Facility: HOSPITAL | Age: 65
End: 2020-01-08

## 2020-01-08 ENCOUNTER — OFFICE VISIT (OUTPATIENT)
Dept: ONCOLOGY | Facility: CLINIC | Age: 65
End: 2020-01-08

## 2020-01-08 ENCOUNTER — INFUSION (OUTPATIENT)
Dept: ONCOLOGY | Facility: HOSPITAL | Age: 65
End: 2020-01-08

## 2020-01-08 VITALS
WEIGHT: 165.4 LBS | OXYGEN SATURATION: 99 % | DIASTOLIC BLOOD PRESSURE: 96 MMHG | BODY MASS INDEX: 20.56 KG/M2 | TEMPERATURE: 97.6 F | HEIGHT: 75 IN | HEART RATE: 64 BPM | RESPIRATION RATE: 16 BRPM | SYSTOLIC BLOOD PRESSURE: 156 MMHG

## 2020-01-08 VITALS — HEART RATE: 67 BPM | SYSTOLIC BLOOD PRESSURE: 124 MMHG | DIASTOLIC BLOOD PRESSURE: 85 MMHG

## 2020-01-08 DIAGNOSIS — D69.6 THROMBOCYTOPENIA (HCC): ICD-10-CM

## 2020-01-08 DIAGNOSIS — E83.110 HEREDITARY HEMOCHROMATOSIS (HCC): Primary | ICD-10-CM

## 2020-01-08 DIAGNOSIS — E83.110 HEREDITARY HEMOCHROMATOSIS (HCC): ICD-10-CM

## 2020-01-08 LAB
ALPHA-FETOPROTEIN: 1.72 NG/ML (ref 0–8.3)
BASOPHILS # BLD AUTO: 0.07 10*3/MM3 (ref 0–0.2)
BASOPHILS NFR BLD AUTO: 1.7 % (ref 0–1.5)
DEPRECATED RDW RBC AUTO: 47.7 FL (ref 37–54)
EOSINOPHIL # BLD AUTO: 0.33 10*3/MM3 (ref 0–0.4)
EOSINOPHIL NFR BLD AUTO: 8 % (ref 0.3–6.2)
ERYTHROCYTE [DISTWIDTH] IN BLOOD BY AUTOMATED COUNT: 12.5 % (ref 12.3–15.4)
FERRITIN SERPL-MCNC: 656.5 NG/ML (ref 30–400)
HCT VFR BLD AUTO: 48.6 % (ref 37.5–51)
HGB BLD-MCNC: 15.8 G/DL (ref 13–17.7)
HGB RETIC QN AUTO: 36.8 PG (ref 29.8–36.1)
IMM GRANULOCYTES # BLD AUTO: 0.01 10*3/MM3 (ref 0–0.05)
IMM GRANULOCYTES NFR BLD AUTO: 0.2 % (ref 0–0.5)
IMM RETICS NFR: 3.7 % (ref 3–15.8)
IRON 24H UR-MRATE: 155 MCG/DL (ref 59–158)
IRON SATN MFR SERPL: 68 % (ref 14–48)
LYMPHOCYTES # BLD AUTO: 1.15 10*3/MM3 (ref 0.7–3.1)
LYMPHOCYTES NFR BLD AUTO: 27.7 % (ref 19.6–45.3)
MCH RBC QN AUTO: 33 PG (ref 26.6–33)
MCHC RBC AUTO-ENTMCNC: 32.5 G/DL (ref 31.5–35.7)
MCV RBC AUTO: 101.5 FL (ref 79–97)
MONOCYTES # BLD AUTO: 0.55 10*3/MM3 (ref 0.1–0.9)
MONOCYTES NFR BLD AUTO: 13.3 % (ref 5–12)
NEUTROPHILS # BLD AUTO: 2.04 10*3/MM3 (ref 1.7–7)
NEUTROPHILS NFR BLD AUTO: 49.1 % (ref 42.7–76)
NRBC BLD AUTO-RTO: 0 /100 WBC (ref 0–0.2)
PLATELET # BLD AUTO: 115 10*3/MM3 (ref 140–450)
PMV BLD AUTO: 10.3 FL (ref 6–12)
RBC # BLD AUTO: 4.79 10*6/MM3 (ref 4.14–5.8)
RETICS/RBC NFR AUTO: 0.68 % (ref 0.7–1.9)
TIBC SERPL-MCNC: 227 MCG/DL (ref 249–505)
TRANSFERRIN SERPL-MCNC: 162 MG/DL (ref 200–360)
WBC NRBC COR # BLD: 4.15 10*3/MM3 (ref 3.4–10.8)

## 2020-01-08 PROCEDURE — 36415 COLL VENOUS BLD VENIPUNCTURE: CPT

## 2020-01-08 PROCEDURE — 82728 ASSAY OF FERRITIN: CPT

## 2020-01-08 PROCEDURE — 84466 ASSAY OF TRANSFERRIN: CPT

## 2020-01-08 PROCEDURE — 83540 ASSAY OF IRON: CPT

## 2020-01-08 PROCEDURE — 99214 OFFICE O/P EST MOD 30 MIN: CPT | Performed by: INTERNAL MEDICINE

## 2020-01-08 PROCEDURE — 82105 ALPHA-FETOPROTEIN SERUM: CPT | Performed by: INTERNAL MEDICINE

## 2020-01-08 PROCEDURE — 85025 COMPLETE CBC W/AUTO DIFF WBC: CPT

## 2020-01-08 PROCEDURE — 99195 PHLEBOTOMY: CPT | Performed by: INTERNAL MEDICINE

## 2020-01-08 PROCEDURE — 85046 RETICYTE/HGB CONCENTRATE: CPT

## 2020-01-08 RX ORDER — SODIUM CHLORIDE 9 MG/ML
250 INJECTION, SOLUTION INTRAVENOUS ONCE
Status: CANCELLED
Start: 2020-01-15

## 2020-01-08 RX ORDER — SODIUM CHLORIDE 9 MG/ML
250 INJECTION, SOLUTION INTRAVENOUS ONCE
Status: DISCONTINUED | OUTPATIENT
Start: 2020-01-08 | End: 2020-01-08 | Stop reason: HOSPADM

## 2020-01-08 NOTE — TELEPHONE ENCOUNTER
----- Message from Prakash Delgado MD sent at 1/8/2020 12:50 PM EST -----  Please call the patient regarding his abnormal result.  Please let him know that his ferritin has improved from 887 to 656 which I think is very good.  We will continue on with our current phlebotomy schedule as planned.  Thanks, JAMIE

## 2020-01-08 NOTE — PROGRESS NOTES
[facility] GROUP OUTPATIENT FOLLOW UP CLINIC VISIT    REASON FOR FOLLOW-UP:    1.  Thrombocytopenia  2.  Elevated ferritin with newly diagnosed hereditary hemochromatosis on 8/14/2019.  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  Baseline ferritin in the office on 8/14/2019 is 929, highest 1063 on 10/9/2019.       HISTORY OF PRESENT ILLNESS:  Pablito Bergman is a 65 y.o. male who returns today for follow up of the above issue.      He continues monthly phlebotomy with his last on 12/4/2019.  His ferritin has decreased, last 887 on that date.  He tolerates phlebotomy well with some mild lightheadedness but no syncope on the day of his phlebotomy.  Otherwise he denies any new problems today.    HEMATOLOGIC HISTORY:  He has a history of smoking.  Based on this, he has had recurrent CT imaging of his chest.  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  CT imaging of his chest from 6/20/2019 showed that his liver is more dense than his spleen.  This was previously present as well.  Based on this, there was some concern for the possibility of liver disease and hemochromatosis.     Labs on 6/18/2019 showed a platelet count of 115,000, hemoglobin 16.0 hematocrit 48.1%, and white blood cells 4.1 with a normal differential.  As of 6/27/2019 his ferritin was 866 with an iron of 135 and 63% iron saturation.         Previously, on 11/5/2018 his platelets were 117,000 with a hemoglobin of 16.0 and hematocrit 46.1%.     He is asymptomatic.  He continues to smoke.  He denies significant alcohol use.  He denies significant pain or inflammation.  He denies any family history of hemochromatosis.    ALLERGIES:  Allergies   Allergen Reactions   • Other Unknown - Low Severity     Red pepper headache reaction       MEDICATIONS:  The medication list has been reviewed with the patient by the medical assistant, and the list has been updated in the electronic medical record, which I reviewed.  Medication  "dosages and frequencies were confirmed to be accurate.    REVIEW OF SYSTEMS:  PAIN:  See Vital Signs below.  GENERAL:  No fevers, chills, night sweats, or unintended weight loss.  SKIN:  No rashes or non-healing lesions.  HEME/LYMPH:  No abnormal bleeding.  No palpable lymphadenopathy.  EYES:  No vision changes or diplopia.  ENT:  No sore throat or difficulty swallowing.  RESPIRATORY:  No cough, shortness of breath, hemoptysis, or wheezing.  CARDIOVASCULAR:  No chest pain, palpitations, orthopnea, or dyspnea on exertion.  GASTROINTESTINAL:  No abdominal pain, nausea, vomiting, constipation, diarrhea, melena, or hematochezia.  GENITOURINARY:  No dysuria or hematuria.  MUSCULOSKELETAL:  No joint pain, swelling, or erythema.  NEUROLOGIC:  No dizziness, loss of consciousness, or seizures.  PSYCHIATRIC:  No depression, anxiety, or mood changes.    Vitals:    01/08/20 0749   BP: 156/96   Pulse: 64   Resp: 16   Temp: 97.6 °F (36.4 °C)   TempSrc: Oral   SpO2: 99%   Weight: 75 kg (165 lb 6.4 oz)   Height: 190 cm (74.8\")   PainSc: 0-No pain  Comment: hereditary hemochromatosis       PHYSICAL EXAMINATION:  GENERAL:  Well-developed well-nourished male; awake, alert and oriented, in no acute distress.  SKIN:  Warm and dry, without rashes, purpura, or petechiae.  HEAD:  Normocephalic, atraumatic.  EARS:  Hearing intact.  NOSE:  Septum midline.  No excoriations or nasal discharge.  MOUTH:  No stomatitis or ulcers.  Lips are normal.  THROAT:  Oropharynx without lesions or exudates.  LYMPHATICS:  No cervical, supraclavicular, or axillary lymphadenopathy.  CHEST:  Lungs are clear to auscultation bilaterally.  No wheezes, rales, or rhonchi.  HEART:  Regular rate; normal rhythm.  No murmurs, gallops or rubs.  EXTREMITIES:  No clubbing, cyanosis, or edema.  NEUROLOGICAL:  No focal neurologic deficits.    DIAGNOSTIC DATA:  Results for orders placed or performed in visit on 01/08/20   Retic With IRF & RET-He   Result Value Ref Range    " Immature Reticulocyte Fraction 3.7 3.0 - 15.8 %    Reticulocyte % 0.68 (L) 0.70 - 1.90 %    Reticulocyte Hgb 36.8 (H) 29.8 - 36.1 pg   CBC Auto Differential   Result Value Ref Range    WBC 4.15 3.40 - 10.80 10*3/mm3    RBC 4.79 4.14 - 5.80 10*6/mm3    Hemoglobin 15.8 13.0 - 17.7 g/dL    Hematocrit 48.6 37.5 - 51.0 %    .5 (H) 79.0 - 97.0 fL    MCH 33.0 26.6 - 33.0 pg    MCHC 32.5 31.5 - 35.7 g/dL    RDW 12.5 12.3 - 15.4 %    RDW-SD 47.7 37.0 - 54.0 fl    MPV 10.3 6.0 - 12.0 fL    Platelets 115 (L) 140 - 450 10*3/mm3    Neutrophil % 49.1 42.7 - 76.0 %    Lymphocyte % 27.7 19.6 - 45.3 %    Monocyte % 13.3 (H) 5.0 - 12.0 %    Eosinophil % 8.0 (H) 0.3 - 6.2 %    Basophil % 1.7 (H) 0.0 - 1.5 %    Immature Grans % 0.2 0.0 - 0.5 %    Neutrophils, Absolute 2.04 1.70 - 7.00 10*3/mm3    Lymphocytes, Absolute 1.15 0.70 - 3.10 10*3/mm3    Monocytes, Absolute 0.55 0.10 - 0.90 10*3/mm3    Eosinophils, Absolute 0.33 0.00 - 0.40 10*3/mm3    Basophils, Absolute 0.07 0.00 - 0.20 10*3/mm3    Immature Grans, Absolute 0.01 0.00 - 0.05 10*3/mm3    nRBC 0.0 0.0 - 0.2 /100 WBC       IMAGING: None reviewed    ASSESSMENT:  This is a 65 y.o. male with:  1. Thrombocytopenia: This has been chronic.  I did not see any evidence for platelet clumping on his peripheral blood smear.  He is asymptomatic.  His IPF is slightly elevated at 8.7%.  Vitamin B12 normal at 304.  Continue to monitor.    2. Hereditary hemochromatosis:  He is a homozygote for the HFE gene C282Y mutation.  Baseline ferritin in the office on 8/14/2019 is 929.  I gave him a copy of the lab work today and educated him regarding hemochromatosis.  I advised him that some people with his genotype are more affected than others.  Considering that his ferritin is only in the 900s at age 64 I do not think he has been accumulating iron that rapidly throughout his life.  I advised him that we need to get rid of the iron with therapeutic phlebotomy.  At some point we may obtain MRI  imaging of his liver to look for iron deposition and we will need to check an alpha-fetoprotein level at some point as well.         PLAN:  1.  Therapeutic phlebotomy today and we will accelerate our phlebotomy schedule to every 3 weeks if tolerated.  I will see him back in about 4 months for follow-up.  CBC and ferritin with each visit.  Goal ferritin around .

## 2020-01-08 NOTE — TELEPHONE ENCOUNTER
Called patient with results Per Dr. Delgado.   Cannot leave a message.   Will call back at another time.

## 2020-01-09 ENCOUNTER — TELEPHONE (OUTPATIENT)
Dept: ONCOLOGY | Facility: CLINIC | Age: 65
End: 2020-01-09

## 2020-01-09 NOTE — TELEPHONE ENCOUNTER
Called patient with results again.  Got hold and spoke to patient.   He will continue current phlebotomy schedule as planned.

## 2020-01-29 ENCOUNTER — LAB (OUTPATIENT)
Dept: LAB | Facility: HOSPITAL | Age: 65
End: 2020-01-29

## 2020-01-29 ENCOUNTER — INFUSION (OUTPATIENT)
Dept: ONCOLOGY | Facility: HOSPITAL | Age: 65
End: 2020-01-29

## 2020-01-29 VITALS
BODY MASS INDEX: 20.61 KG/M2 | OXYGEN SATURATION: 94 % | DIASTOLIC BLOOD PRESSURE: 90 MMHG | SYSTOLIC BLOOD PRESSURE: 130 MMHG | HEART RATE: 70 BPM | TEMPERATURE: 98.1 F | WEIGHT: 164 LBS

## 2020-01-29 DIAGNOSIS — E83.110 HEREDITARY HEMOCHROMATOSIS (HCC): ICD-10-CM

## 2020-01-29 DIAGNOSIS — E83.110 HEREDITARY HEMOCHROMATOSIS (HCC): Primary | ICD-10-CM

## 2020-01-29 LAB
BASOPHILS # BLD AUTO: 0.06 10*3/MM3 (ref 0–0.2)
BASOPHILS NFR BLD AUTO: 1.3 % (ref 0–1.5)
DEPRECATED RDW RBC AUTO: 47.1 FL (ref 37–54)
EOSINOPHIL # BLD AUTO: 0.24 10*3/MM3 (ref 0–0.4)
EOSINOPHIL NFR BLD AUTO: 5.1 % (ref 0.3–6.2)
ERYTHROCYTE [DISTWIDTH] IN BLOOD BY AUTOMATED COUNT: 12.8 % (ref 12.3–15.4)
FERRITIN SERPL-MCNC: 619.8 NG/ML (ref 30–400)
HCT VFR BLD AUTO: 48.8 % (ref 37.5–51)
HGB BLD-MCNC: 16.1 G/DL (ref 13–17.7)
IMM GRANULOCYTES # BLD AUTO: 0.01 10*3/MM3 (ref 0–0.05)
IMM GRANULOCYTES NFR BLD AUTO: 0.2 % (ref 0–0.5)
LYMPHOCYTES # BLD AUTO: 1.41 10*3/MM3 (ref 0.7–3.1)
LYMPHOCYTES NFR BLD AUTO: 29.8 % (ref 19.6–45.3)
MCH RBC QN AUTO: 32.7 PG (ref 26.6–33)
MCHC RBC AUTO-ENTMCNC: 33 G/DL (ref 31.5–35.7)
MCV RBC AUTO: 99.2 FL (ref 79–97)
MONOCYTES # BLD AUTO: 0.47 10*3/MM3 (ref 0.1–0.9)
MONOCYTES NFR BLD AUTO: 9.9 % (ref 5–12)
NEUTROPHILS # BLD AUTO: 2.54 10*3/MM3 (ref 1.7–7)
NEUTROPHILS NFR BLD AUTO: 53.7 % (ref 42.7–76)
NRBC BLD AUTO-RTO: 0 /100 WBC (ref 0–0.2)
PLATELET # BLD AUTO: 132 10*3/MM3 (ref 140–450)
PMV BLD AUTO: 10.6 FL (ref 6–12)
RBC # BLD AUTO: 4.92 10*6/MM3 (ref 4.14–5.8)
WBC NRBC COR # BLD: 4.73 10*3/MM3 (ref 3.4–10.8)

## 2020-01-29 PROCEDURE — 36415 COLL VENOUS BLD VENIPUNCTURE: CPT

## 2020-01-29 PROCEDURE — 85025 COMPLETE CBC W/AUTO DIFF WBC: CPT

## 2020-01-29 PROCEDURE — 99195 PHLEBOTOMY: CPT | Performed by: INTERNAL MEDICINE

## 2020-01-29 PROCEDURE — 82728 ASSAY OF FERRITIN: CPT

## 2020-01-29 RX ORDER — SODIUM CHLORIDE 9 MG/ML
250 INJECTION, SOLUTION INTRAVENOUS ONCE
Status: CANCELLED
Start: 2020-02-05

## 2020-02-26 ENCOUNTER — LAB (OUTPATIENT)
Dept: LAB | Facility: HOSPITAL | Age: 65
End: 2020-02-26

## 2020-02-26 ENCOUNTER — INFUSION (OUTPATIENT)
Dept: ONCOLOGY | Facility: HOSPITAL | Age: 65
End: 2020-02-26

## 2020-02-26 VITALS
OXYGEN SATURATION: 97 % | HEART RATE: 67 BPM | SYSTOLIC BLOOD PRESSURE: 122 MMHG | WEIGHT: 166 LBS | BODY MASS INDEX: 20.86 KG/M2 | DIASTOLIC BLOOD PRESSURE: 81 MMHG | TEMPERATURE: 98.9 F

## 2020-02-26 DIAGNOSIS — E83.110 HEREDITARY HEMOCHROMATOSIS (HCC): ICD-10-CM

## 2020-02-26 DIAGNOSIS — E83.110 HEREDITARY HEMOCHROMATOSIS (HCC): Primary | ICD-10-CM

## 2020-02-26 LAB
BASOPHILS # BLD AUTO: 0.07 10*3/MM3 (ref 0–0.2)
BASOPHILS NFR BLD AUTO: 1.3 % (ref 0–1.5)
DEPRECATED RDW RBC AUTO: 47.7 FL (ref 37–54)
EOSINOPHIL # BLD AUTO: 0.33 10*3/MM3 (ref 0–0.4)
EOSINOPHIL NFR BLD AUTO: 6.2 % (ref 0.3–6.2)
ERYTHROCYTE [DISTWIDTH] IN BLOOD BY AUTOMATED COUNT: 12.7 % (ref 12.3–15.4)
FERRITIN SERPL-MCNC: 622.4 NG/ML (ref 30–400)
HCT VFR BLD AUTO: 49.1 % (ref 37.5–51)
HGB BLD-MCNC: 16.1 G/DL (ref 13–17.7)
IMM GRANULOCYTES # BLD AUTO: 0.01 10*3/MM3 (ref 0–0.05)
IMM GRANULOCYTES NFR BLD AUTO: 0.2 % (ref 0–0.5)
LYMPHOCYTES # BLD AUTO: 1.43 10*3/MM3 (ref 0.7–3.1)
LYMPHOCYTES NFR BLD AUTO: 26.7 % (ref 19.6–45.3)
MCH RBC QN AUTO: 33.1 PG (ref 26.6–33)
MCHC RBC AUTO-ENTMCNC: 32.8 G/DL (ref 31.5–35.7)
MCV RBC AUTO: 100.8 FL (ref 79–97)
MONOCYTES # BLD AUTO: 0.47 10*3/MM3 (ref 0.1–0.9)
MONOCYTES NFR BLD AUTO: 8.8 % (ref 5–12)
NEUTROPHILS # BLD AUTO: 3.04 10*3/MM3 (ref 1.7–7)
NEUTROPHILS NFR BLD AUTO: 56.8 % (ref 42.7–76)
NRBC BLD AUTO-RTO: 0 /100 WBC (ref 0–0.2)
PLATELET # BLD AUTO: 139 10*3/MM3 (ref 140–450)
PMV BLD AUTO: 10.5 FL (ref 6–12)
RBC # BLD AUTO: 4.87 10*6/MM3 (ref 4.14–5.8)
WBC NRBC COR # BLD: 5.35 10*3/MM3 (ref 3.4–10.8)

## 2020-02-26 PROCEDURE — 99195 PHLEBOTOMY: CPT

## 2020-02-26 PROCEDURE — 85025 COMPLETE CBC W/AUTO DIFF WBC: CPT

## 2020-02-26 PROCEDURE — 82728 ASSAY OF FERRITIN: CPT

## 2020-02-26 PROCEDURE — 36415 COLL VENOUS BLD VENIPUNCTURE: CPT

## 2020-02-26 RX ORDER — SODIUM CHLORIDE 9 MG/ML
250 INJECTION, SOLUTION INTRAVENOUS ONCE
Status: CANCELLED
Start: 2020-03-04

## 2020-02-26 RX ORDER — SODIUM CHLORIDE 9 MG/ML
250 INJECTION, SOLUTION INTRAVENOUS ONCE
Status: DISCONTINUED | OUTPATIENT
Start: 2020-02-26 | End: 2020-02-26 | Stop reason: HOSPADM

## 2020-03-16 ENCOUNTER — TELEPHONE (OUTPATIENT)
Dept: ONCOLOGY | Facility: CLINIC | Age: 65
End: 2020-03-16

## 2020-03-16 NOTE — TELEPHONE ENCOUNTER
Per Dr. Delgado pt is not to come to our office for his appt.Message left.  for pt  He was advised to stay home , self quarantine for 14 days  and contact the local health department if he hasn't already.

## 2020-03-16 NOTE — TELEPHONE ENCOUNTER
Pt has an appt for labs w/ possible phlebotomy on 3/18/20 pt was exposed to someone with the corona virus on March 7th pt wants to know if he should cancel his appt     Pt contact # [phone]

## 2020-03-18 ENCOUNTER — APPOINTMENT (OUTPATIENT)
Dept: LAB | Facility: HOSPITAL | Age: 65
End: 2020-03-18

## 2020-03-18 ENCOUNTER — APPOINTMENT (OUTPATIENT)
Dept: ONCOLOGY | Facility: HOSPITAL | Age: 65
End: 2020-03-18

## 2020-04-07 ENCOUNTER — TELEPHONE (OUTPATIENT)
Dept: ONCOLOGY | Facility: CLINIC | Age: 65
End: 2020-04-07

## 2020-04-08 ENCOUNTER — APPOINTMENT (OUTPATIENT)
Dept: ONCOLOGY | Facility: HOSPITAL | Age: 65
End: 2020-04-08

## 2020-04-08 ENCOUNTER — APPOINTMENT (OUTPATIENT)
Dept: LAB | Facility: HOSPITAL | Age: 65
End: 2020-04-08

## 2020-04-28 ENCOUNTER — TELEPHONE (OUTPATIENT)
Dept: ONCOLOGY | Facility: CLINIC | Age: 65
End: 2020-04-28

## 2020-04-28 NOTE — TELEPHONE ENCOUNTER
----- Message from Prakash Delgado MD sent at 4/28/2020  1:46 PM EDT -----  Regarding: appt tomorrow  Is he coming in for his appt tomorrow?  Would be better for me to see him around the time I'm seeing my 1:00 patient if he wants to see me.  JAMIE

## 2020-04-29 ENCOUNTER — APPOINTMENT (OUTPATIENT)
Dept: LAB | Facility: HOSPITAL | Age: 65
End: 2020-04-29

## 2020-04-29 ENCOUNTER — APPOINTMENT (OUTPATIENT)
Dept: ONCOLOGY | Facility: HOSPITAL | Age: 65
End: 2020-04-29

## 2020-04-29 ENCOUNTER — INFUSION (OUTPATIENT)
Dept: ONCOLOGY | Facility: HOSPITAL | Age: 65
End: 2020-04-29

## 2020-04-29 ENCOUNTER — OFFICE VISIT (OUTPATIENT)
Dept: ONCOLOGY | Facility: CLINIC | Age: 65
End: 2020-04-29

## 2020-04-29 ENCOUNTER — LAB (OUTPATIENT)
Dept: LAB | Facility: HOSPITAL | Age: 65
End: 2020-04-29

## 2020-04-29 VITALS
WEIGHT: 160.6 LBS | HEIGHT: 75 IN | DIASTOLIC BLOOD PRESSURE: 99 MMHG | OXYGEN SATURATION: 98 % | TEMPERATURE: 98.2 F | BODY MASS INDEX: 19.97 KG/M2 | SYSTOLIC BLOOD PRESSURE: 143 MMHG | RESPIRATION RATE: 16 BRPM | HEART RATE: 65 BPM

## 2020-04-29 VITALS — DIASTOLIC BLOOD PRESSURE: 81 MMHG | SYSTOLIC BLOOD PRESSURE: 118 MMHG

## 2020-04-29 DIAGNOSIS — E83.110 HEREDITARY HEMOCHROMATOSIS (HCC): ICD-10-CM

## 2020-04-29 DIAGNOSIS — D69.6 THROMBOCYTOPENIA (HCC): Primary | ICD-10-CM

## 2020-04-29 DIAGNOSIS — E83.110 HEREDITARY HEMOCHROMATOSIS (HCC): Primary | ICD-10-CM

## 2020-04-29 LAB
BASOPHILS # BLD AUTO: 0.08 10*3/MM3 (ref 0–0.2)
BASOPHILS NFR BLD AUTO: 1.5 % (ref 0–1.5)
DEPRECATED RDW RBC AUTO: 45.2 FL (ref 37–54)
EOSINOPHIL # BLD AUTO: 0.21 10*3/MM3 (ref 0–0.4)
EOSINOPHIL NFR BLD AUTO: 3.9 % (ref 0.3–6.2)
ERYTHROCYTE [DISTWIDTH] IN BLOOD BY AUTOMATED COUNT: 12.4 % (ref 12.3–15.4)
FERRITIN SERPL-MCNC: 619.8 NG/ML (ref 30–400)
HCT VFR BLD AUTO: 48 % (ref 37.5–51)
HGB BLD-MCNC: 16.1 G/DL (ref 13–17.7)
HGB RETIC QN AUTO: 37.8 PG (ref 29.8–36.1)
IMM GRANULOCYTES # BLD AUTO: 0.01 10*3/MM3 (ref 0–0.05)
IMM GRANULOCYTES NFR BLD AUTO: 0.2 % (ref 0–0.5)
IMM RETICS NFR: 4.7 % (ref 3–15.8)
LYMPHOCYTES # BLD AUTO: 1.49 10*3/MM3 (ref 0.7–3.1)
LYMPHOCYTES NFR BLD AUTO: 27.5 % (ref 19.6–45.3)
MCH RBC QN AUTO: 33.3 PG (ref 26.6–33)
MCHC RBC AUTO-ENTMCNC: 33.5 G/DL (ref 31.5–35.7)
MCV RBC AUTO: 99.2 FL (ref 79–97)
MONOCYTES # BLD AUTO: 0.45 10*3/MM3 (ref 0.1–0.9)
MONOCYTES NFR BLD AUTO: 8.3 % (ref 5–12)
NEUTROPHILS # BLD AUTO: 3.17 10*3/MM3 (ref 1.7–7)
NEUTROPHILS NFR BLD AUTO: 58.6 % (ref 42.7–76)
NRBC BLD AUTO-RTO: 0 /100 WBC (ref 0–0.2)
PLATELET # BLD AUTO: 130 10*3/MM3 (ref 140–450)
PMV BLD AUTO: 10.8 FL (ref 6–12)
RBC # BLD AUTO: 4.84 10*6/MM3 (ref 4.14–5.8)
RETICS/RBC NFR AUTO: 0.95 % (ref 0.7–1.9)
WBC NRBC COR # BLD: 5.41 10*3/MM3 (ref 3.4–10.8)

## 2020-04-29 PROCEDURE — 85025 COMPLETE CBC W/AUTO DIFF WBC: CPT

## 2020-04-29 PROCEDURE — 85046 RETICYTE/HGB CONCENTRATE: CPT

## 2020-04-29 PROCEDURE — 82728 ASSAY OF FERRITIN: CPT

## 2020-04-29 PROCEDURE — 36415 COLL VENOUS BLD VENIPUNCTURE: CPT

## 2020-04-29 PROCEDURE — 99195 PHLEBOTOMY: CPT

## 2020-04-29 PROCEDURE — 99213 OFFICE O/P EST LOW 20 MIN: CPT | Performed by: INTERNAL MEDICINE

## 2020-04-29 RX ORDER — SODIUM CHLORIDE 9 MG/ML
250 INJECTION, SOLUTION INTRAVENOUS ONCE
Status: CANCELLED
Start: 2020-05-06

## 2020-04-29 NOTE — PROGRESS NOTES
[facility] GROUP OUTPATIENT FOLLOW UP CLINIC VISIT    REASON FOR FOLLOW-UP:    1.  Thrombocytopenia  2.  Elevated ferritin with newly diagnosed hereditary hemochromatosis on 8/14/2019.  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  Baseline ferritin in the office on 8/14/2019 is 929, highest 1063 on 10/9/2019.       HISTORY OF PRESENT ILLNESS:  Pablito Bergman is a 65 y.o. male who returns today for follow up of the above issue.      He feels well.  He has not been here for about 2 months due to COVID-19.  Previously, he was receiving therapeutic phlebotomy every 3 weeks.  His ferritin has been stable over the past couple of months.      HEMATOLOGIC HISTORY:  He has a history of smoking.  Based on this, he has had recurrent CT imaging of his chest.  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  CT imaging of his chest from 6/20/2019 showed that his liver is more dense than his spleen.  This was previously present as well.  Based on this, there was some concern for the possibility of liver disease and hemochromatosis.     Labs on 6/18/2019 showed a platelet count of 115,000, hemoglobin 16.0 hematocrit 48.1%, and white blood cells 4.1 with a normal differential.  As of 6/27/2019 his ferritin was 866 with an iron of 135 and 63% iron saturation.         Previously, on 11/5/2018 his platelets were 117,000 with a hemoglobin of 16.0 and hematocrit 46.1%.     He is asymptomatic.  He continues to smoke.  He denies significant alcohol use.  He denies significant pain or inflammation.  He denies any family history of hemochromatosis.    ALLERGIES:  Allergies   Allergen Reactions   • Other Unknown - Low Severity     Red pepper headache reaction       MEDICATIONS:  The medication list has been reviewed with the patient by the medical assistant, and the list has been updated in the electronic medical record, which I reviewed.  Medication dosages and frequencies were confirmed to be  "accurate.    REVIEW OF SYSTEMS:  PAIN:  See Vital Signs below.  GENERAL:  No fevers, chills, night sweats, or unintended weight loss.  SKIN:  No rashes or non-healing lesions.  HEME/LYMPH:  No abnormal bleeding.  No palpable lymphadenopathy.  EYES:  No vision changes or diplopia.  ENT:  No sore throat or difficulty swallowing.  RESPIRATORY:  No cough, shortness of breath, hemoptysis, or wheezing.  CARDIOVASCULAR:  No chest pain, palpitations, orthopnea, or dyspnea on exertion.  GASTROINTESTINAL:  No abdominal pain, nausea, vomiting, constipation, diarrhea, melena, or hematochezia.  GENITOURINARY:  No dysuria or hematuria.  MUSCULOSKELETAL:  No joint pain, swelling, or erythema.  NEUROLOGIC:  No dizziness, loss of consciousness, or seizures.  PSYCHIATRIC:  No depression, anxiety, or mood changes.    Vitals:    04/29/20 1315   BP: 143/99  Comment: did take medication.  always high when he comes here   Pulse: 65   Resp: 16   Temp: 98.2 °F (36.8 °C)   TempSrc: Oral   SpO2: 98%   Weight: 72.8 kg (160 lb 9.6 oz)   Height: 190 cm (74.8\")   PainSc: 0-No pain  Comment: hereditary hemchromatosis       PHYSICAL EXAMINATION:  GENERAL:  Well-developed well-nourished male; awake, alert and oriented, in no acute distress.  Speech is fluent.  Otherwise he was not examined today.    DIAGNOSTIC DATA:  Results for orders placed or performed in visit on 04/29/20   Retic With IRF & RET-He   Result Value Ref Range    Immature Reticulocyte Fraction 4.7 3.0 - 15.8 %    Reticulocyte % 0.95 0.70 - 1.90 %    Reticulocyte Hgb 37.8 (H) 29.8 - 36.1 pg   CBC Auto Differential   Result Value Ref Range    WBC 5.41 3.40 - 10.80 10*3/mm3    RBC 4.84 4.14 - 5.80 10*6/mm3    Hemoglobin 16.1 13.0 - 17.7 g/dL    Hematocrit 48.0 37.5 - 51.0 %    MCV 99.2 (H) 79.0 - 97.0 fL    MCH 33.3 (H) 26.6 - 33.0 pg    MCHC 33.5 31.5 - 35.7 g/dL    RDW 12.4 12.3 - 15.4 %    RDW-SD 45.2 37.0 - 54.0 fl    MPV 10.8 6.0 - 12.0 fL    Platelets 130 (L) 140 - 450 10*3/mm3    " Neutrophil % 58.6 42.7 - 76.0 %    Lymphocyte % 27.5 19.6 - 45.3 %    Monocyte % 8.3 5.0 - 12.0 %    Eosinophil % 3.9 0.3 - 6.2 %    Basophil % 1.5 0.0 - 1.5 %    Immature Grans % 0.2 0.0 - 0.5 %    Neutrophils, Absolute 3.17 1.70 - 7.00 10*3/mm3    Lymphocytes, Absolute 1.49 0.70 - 3.10 10*3/mm3    Monocytes, Absolute 0.45 0.10 - 0.90 10*3/mm3    Eosinophils, Absolute 0.21 0.00 - 0.40 10*3/mm3    Basophils, Absolute 0.08 0.00 - 0.20 10*3/mm3    Immature Grans, Absolute 0.01 0.00 - 0.05 10*3/mm3    nRBC 0.0 0.0 - 0.2 /100 WBC       IMAGING: None reviewed    ASSESSMENT:  This is a 65 y.o. male with:  1. Thrombocytopenia: This has been chronic.  I did not see any evidence for platelet clumping on his peripheral blood smear.  He is asymptomatic.  His IPF is slightly elevated at 8.7%.  Vitamin B12 normal at 304.  Continue to monitor.  Platelet count is stable.    2. Hereditary hemochromatosis:  He is a homozygote for the HFE gene C282Y mutation.  Baseline ferritin in the office on 8/14/2019 is 929.  I gave him a copy of the lab work today and educated him regarding hemochromatosis.  I advised him that some people with his genotype are more affected than others.  Considering that his ferritin is only in the 900s at age 64 I do not think he has been accumulating iron that rapidly throughout his life.  I advised him that we need to get rid of the iron with therapeutic phlebotomy.  At some point we may obtain MRI imaging of his liver to look for iron deposition and we will need to check an alpha-fetoprotein level at some point as well.     Through 2/26/2020 he was receiving therapeutic phlebotomy every 3 to 4 weeks.  His ferritin had plateaued in the low 600s.  He has not been coming into the office due to COVID-19.  Ferritin is stable at 619 today.        PLAN:  1.  Therapeutic phlebotomy today.  His last phlebotomy was on 2/26/2020.  He has not been coming to the office due to COVID-19.  Proceed with phlebotomy today and  every 3 weeks with a goal ferritin of .  I will see him back in 3 to 4 months.

## 2020-04-30 ENCOUNTER — TELEPHONE (OUTPATIENT)
Dept: ONCOLOGY | Facility: CLINIC | Age: 65
End: 2020-04-30

## 2020-05-20 ENCOUNTER — INFUSION (OUTPATIENT)
Dept: ONCOLOGY | Facility: HOSPITAL | Age: 65
End: 2020-05-20

## 2020-05-20 ENCOUNTER — LAB (OUTPATIENT)
Dept: LAB | Facility: HOSPITAL | Age: 65
End: 2020-05-20

## 2020-05-20 VITALS
TEMPERATURE: 98.1 F | HEART RATE: 66 BPM | OXYGEN SATURATION: 97 % | DIASTOLIC BLOOD PRESSURE: 92 MMHG | WEIGHT: 156 LBS | BODY MASS INDEX: 19.6 KG/M2 | SYSTOLIC BLOOD PRESSURE: 135 MMHG

## 2020-05-20 DIAGNOSIS — D69.6 THROMBOCYTOPENIA (HCC): ICD-10-CM

## 2020-05-20 DIAGNOSIS — E83.110 HEREDITARY HEMOCHROMATOSIS (HCC): ICD-10-CM

## 2020-05-20 DIAGNOSIS — E83.110 HEREDITARY HEMOCHROMATOSIS (HCC): Primary | ICD-10-CM

## 2020-05-20 LAB
BASOPHILS # BLD AUTO: 0.07 10*3/MM3 (ref 0–0.2)
BASOPHILS NFR BLD AUTO: 1.4 % (ref 0–1.5)
DEPRECATED RDW RBC AUTO: 47.8 FL (ref 37–54)
EOSINOPHIL # BLD AUTO: 0.26 10*3/MM3 (ref 0–0.4)
EOSINOPHIL NFR BLD AUTO: 5.1 % (ref 0.3–6.2)
ERYTHROCYTE [DISTWIDTH] IN BLOOD BY AUTOMATED COUNT: 12.7 % (ref 12.3–15.4)
FERRITIN SERPL-MCNC: 643.9 NG/ML (ref 30–400)
HCT VFR BLD AUTO: 48.9 % (ref 37.5–51)
HGB BLD-MCNC: 16 G/DL (ref 13–17.7)
HGB RETIC QN AUTO: 38.6 PG (ref 29.8–36.1)
IMM GRANULOCYTES # BLD AUTO: 0.01 10*3/MM3 (ref 0–0.05)
IMM GRANULOCYTES NFR BLD AUTO: 0.2 % (ref 0–0.5)
IMM RETICS NFR: 5.3 % (ref 3–15.8)
LYMPHOCYTES # BLD AUTO: 1.35 10*3/MM3 (ref 0.7–3.1)
LYMPHOCYTES NFR BLD AUTO: 26.7 % (ref 19.6–45.3)
MCH RBC QN AUTO: 32.9 PG (ref 26.6–33)
MCHC RBC AUTO-ENTMCNC: 32.7 G/DL (ref 31.5–35.7)
MCV RBC AUTO: 100.4 FL (ref 79–97)
MONOCYTES # BLD AUTO: 0.38 10*3/MM3 (ref 0.1–0.9)
MONOCYTES NFR BLD AUTO: 7.5 % (ref 5–12)
NEUTROPHILS # BLD AUTO: 2.98 10*3/MM3 (ref 1.7–7)
NEUTROPHILS NFR BLD AUTO: 59.1 % (ref 42.7–76)
NRBC BLD AUTO-RTO: 0 /100 WBC (ref 0–0.2)
PLATELET # BLD AUTO: 130 10*3/MM3 (ref 140–450)
PMV BLD AUTO: 10.8 FL (ref 6–12)
RBC # BLD AUTO: 4.87 10*6/MM3 (ref 4.14–5.8)
RETICS/RBC NFR AUTO: 1.24 % (ref 0.7–1.9)
WBC NRBC COR # BLD: 5.05 10*3/MM3 (ref 3.4–10.8)

## 2020-05-20 PROCEDURE — 36415 COLL VENOUS BLD VENIPUNCTURE: CPT

## 2020-05-20 PROCEDURE — 85046 RETICYTE/HGB CONCENTRATE: CPT

## 2020-05-20 PROCEDURE — 99195 PHLEBOTOMY: CPT

## 2020-05-20 PROCEDURE — 82728 ASSAY OF FERRITIN: CPT

## 2020-05-20 PROCEDURE — 85025 COMPLETE CBC W/AUTO DIFF WBC: CPT

## 2020-05-20 RX ORDER — SODIUM CHLORIDE 9 MG/ML
250 INJECTION, SOLUTION INTRAVENOUS ONCE
Status: CANCELLED
Start: 2020-05-27

## 2020-06-10 ENCOUNTER — LAB (OUTPATIENT)
Dept: LAB | Facility: HOSPITAL | Age: 65
End: 2020-06-10

## 2020-06-10 ENCOUNTER — INFUSION (OUTPATIENT)
Dept: ONCOLOGY | Facility: HOSPITAL | Age: 65
End: 2020-06-10

## 2020-06-10 VITALS
BODY MASS INDEX: 19.73 KG/M2 | OXYGEN SATURATION: 97 % | DIASTOLIC BLOOD PRESSURE: 82 MMHG | SYSTOLIC BLOOD PRESSURE: 131 MMHG | HEART RATE: 64 BPM | TEMPERATURE: 97.6 F | WEIGHT: 157 LBS | RESPIRATION RATE: 16 BRPM

## 2020-06-10 DIAGNOSIS — D69.6 THROMBOCYTOPENIA (HCC): ICD-10-CM

## 2020-06-10 DIAGNOSIS — E83.110 HEREDITARY HEMOCHROMATOSIS (HCC): ICD-10-CM

## 2020-06-10 LAB
BASOPHILS # BLD AUTO: 0.08 10*3/MM3 (ref 0–0.2)
BASOPHILS NFR BLD AUTO: 1.6 % (ref 0–1.5)
DEPRECATED RDW RBC AUTO: 48.1 FL (ref 37–54)
EOSINOPHIL # BLD AUTO: 0.29 10*3/MM3 (ref 0–0.4)
EOSINOPHIL NFR BLD AUTO: 5.6 % (ref 0.3–6.2)
ERYTHROCYTE [DISTWIDTH] IN BLOOD BY AUTOMATED COUNT: 12.9 % (ref 12.3–15.4)
FERRITIN SERPL-MCNC: 555.1 NG/ML (ref 30–400)
HCT VFR BLD AUTO: 45.4 % (ref 37.5–51)
HGB BLD-MCNC: 15.2 G/DL (ref 13–17.7)
HGB RETIC QN AUTO: 38.4 PG (ref 29.8–36.1)
IMM GRANULOCYTES # BLD AUTO: 0.01 10*3/MM3 (ref 0–0.05)
IMM GRANULOCYTES NFR BLD AUTO: 0.2 % (ref 0–0.5)
IMM RETICS NFR: 3.8 % (ref 3–15.8)
LYMPHOCYTES # BLD AUTO: 1.71 10*3/MM3 (ref 0.7–3.1)
LYMPHOCYTES NFR BLD AUTO: 33.1 % (ref 19.6–45.3)
MCH RBC QN AUTO: 33.4 PG (ref 26.6–33)
MCHC RBC AUTO-ENTMCNC: 33.5 G/DL (ref 31.5–35.7)
MCV RBC AUTO: 99.8 FL (ref 79–97)
MONOCYTES # BLD AUTO: 0.43 10*3/MM3 (ref 0.1–0.9)
MONOCYTES NFR BLD AUTO: 8.3 % (ref 5–12)
NEUTROPHILS # BLD AUTO: 2.64 10*3/MM3 (ref 1.7–7)
NEUTROPHILS NFR BLD AUTO: 51.2 % (ref 42.7–76)
NRBC BLD AUTO-RTO: 0 /100 WBC (ref 0–0.2)
PLATELET # BLD AUTO: 126 10*3/MM3 (ref 140–450)
PMV BLD AUTO: 10.5 FL (ref 6–12)
RBC # BLD AUTO: 4.55 10*6/MM3 (ref 4.14–5.8)
RETICS/RBC NFR AUTO: 1.23 % (ref 0.7–1.9)
WBC NRBC COR # BLD: 5.16 10*3/MM3 (ref 3.4–10.8)

## 2020-06-10 PROCEDURE — 82728 ASSAY OF FERRITIN: CPT

## 2020-06-10 PROCEDURE — 85046 RETICYTE/HGB CONCENTRATE: CPT

## 2020-06-10 PROCEDURE — 85025 COMPLETE CBC W/AUTO DIFF WBC: CPT

## 2020-06-10 PROCEDURE — 36415 COLL VENOUS BLD VENIPUNCTURE: CPT

## 2020-06-10 PROCEDURE — 99195 PHLEBOTOMY: CPT

## 2020-07-01 ENCOUNTER — INFUSION (OUTPATIENT)
Dept: ONCOLOGY | Facility: HOSPITAL | Age: 65
End: 2020-07-01

## 2020-07-01 ENCOUNTER — LAB (OUTPATIENT)
Dept: LAB | Facility: HOSPITAL | Age: 65
End: 2020-07-01

## 2020-07-01 VITALS
TEMPERATURE: 97.8 F | WEIGHT: 157.6 LBS | RESPIRATION RATE: 16 BRPM | HEART RATE: 72 BPM | SYSTOLIC BLOOD PRESSURE: 117 MMHG | OXYGEN SATURATION: 98 % | DIASTOLIC BLOOD PRESSURE: 79 MMHG | BODY MASS INDEX: 19.8 KG/M2

## 2020-07-01 DIAGNOSIS — D69.6 THROMBOCYTOPENIA (HCC): ICD-10-CM

## 2020-07-01 DIAGNOSIS — E83.110 HEREDITARY HEMOCHROMATOSIS (HCC): ICD-10-CM

## 2020-07-01 LAB
BASOPHILS # BLD AUTO: 0.07 10*3/MM3 (ref 0–0.2)
BASOPHILS NFR BLD AUTO: 1.4 % (ref 0–1.5)
DEPRECATED RDW RBC AUTO: 48.3 FL (ref 37–54)
EOSINOPHIL # BLD AUTO: 0.22 10*3/MM3 (ref 0–0.4)
EOSINOPHIL NFR BLD AUTO: 4.3 % (ref 0.3–6.2)
ERYTHROCYTE [DISTWIDTH] IN BLOOD BY AUTOMATED COUNT: 12.7 % (ref 12.3–15.4)
FERRITIN SERPL-MCNC: 488.7 NG/ML (ref 30–400)
HCT VFR BLD AUTO: 46.5 % (ref 37.5–51)
HGB BLD-MCNC: 15.2 G/DL (ref 13–17.7)
HGB RETIC QN AUTO: 38.5 PG (ref 29.8–36.1)
IMM GRANULOCYTES # BLD AUTO: 0.01 10*3/MM3 (ref 0–0.05)
IMM GRANULOCYTES NFR BLD AUTO: 0.2 % (ref 0–0.5)
IMM RETICS NFR: 5.5 % (ref 3–15.8)
LYMPHOCYTES # BLD AUTO: 1.5 10*3/MM3 (ref 0.7–3.1)
LYMPHOCYTES NFR BLD AUTO: 29.5 % (ref 19.6–45.3)
MCH RBC QN AUTO: 33.4 PG (ref 26.6–33)
MCHC RBC AUTO-ENTMCNC: 32.7 G/DL (ref 31.5–35.7)
MCV RBC AUTO: 102.2 FL (ref 79–97)
MONOCYTES # BLD AUTO: 0.45 10*3/MM3 (ref 0.1–0.9)
MONOCYTES NFR BLD AUTO: 8.8 % (ref 5–12)
NEUTROPHILS NFR BLD AUTO: 2.84 10*3/MM3 (ref 1.7–7)
NEUTROPHILS NFR BLD AUTO: 55.8 % (ref 42.7–76)
NRBC BLD AUTO-RTO: 0 /100 WBC (ref 0–0.2)
PLATELET # BLD AUTO: 119 10*3/MM3 (ref 140–450)
PMV BLD AUTO: 11.1 FL (ref 6–12)
RBC # BLD AUTO: 4.55 10*6/MM3 (ref 4.14–5.8)
RETICS/RBC NFR AUTO: 1.23 % (ref 0.7–1.9)
WBC # BLD AUTO: 5.09 10*3/MM3 (ref 3.4–10.8)

## 2020-07-01 PROCEDURE — 85046 RETICYTE/HGB CONCENTRATE: CPT

## 2020-07-01 PROCEDURE — 36415 COLL VENOUS BLD VENIPUNCTURE: CPT

## 2020-07-01 PROCEDURE — 85025 COMPLETE CBC W/AUTO DIFF WBC: CPT

## 2020-07-01 PROCEDURE — 99195 PHLEBOTOMY: CPT

## 2020-07-01 PROCEDURE — 82728 ASSAY OF FERRITIN: CPT

## 2020-07-08 ENCOUNTER — TRANSCRIBE ORDERS (OUTPATIENT)
Dept: ADMINISTRATIVE | Facility: HOSPITAL | Age: 65
End: 2020-07-08

## 2020-07-08 DIAGNOSIS — R91.1 PULMONARY NODULE, LEFT: Primary | ICD-10-CM

## 2020-07-28 ENCOUNTER — INFUSION (OUTPATIENT)
Dept: ONCOLOGY | Facility: HOSPITAL | Age: 65
End: 2020-07-28

## 2020-07-28 ENCOUNTER — LAB (OUTPATIENT)
Dept: LAB | Facility: HOSPITAL | Age: 65
End: 2020-07-28

## 2020-07-28 ENCOUNTER — OFFICE VISIT (OUTPATIENT)
Dept: ONCOLOGY | Facility: CLINIC | Age: 65
End: 2020-07-28

## 2020-07-28 VITALS
BODY MASS INDEX: 19.91 KG/M2 | DIASTOLIC BLOOD PRESSURE: 86 MMHG | RESPIRATION RATE: 16 BRPM | HEIGHT: 75 IN | OXYGEN SATURATION: 96 % | HEART RATE: 69 BPM | WEIGHT: 160.1 LBS | TEMPERATURE: 97.1 F | SYSTOLIC BLOOD PRESSURE: 127 MMHG

## 2020-07-28 DIAGNOSIS — D69.6 THROMBOCYTOPENIA (HCC): ICD-10-CM

## 2020-07-28 DIAGNOSIS — E83.110 HEREDITARY HEMOCHROMATOSIS (HCC): Primary | ICD-10-CM

## 2020-07-28 DIAGNOSIS — E83.110 HEREDITARY HEMOCHROMATOSIS (HCC): ICD-10-CM

## 2020-07-28 LAB
BASOPHILS # BLD AUTO: 0.07 10*3/MM3 (ref 0–0.2)
BASOPHILS NFR BLD AUTO: 1.4 % (ref 0–1.5)
DEPRECATED RDW RBC AUTO: 44.9 FL (ref 37–54)
EOSINOPHIL # BLD AUTO: 0.36 10*3/MM3 (ref 0–0.4)
EOSINOPHIL NFR BLD AUTO: 7.1 % (ref 0.3–6.2)
ERYTHROCYTE [DISTWIDTH] IN BLOOD BY AUTOMATED COUNT: 12.3 % (ref 12.3–15.4)
FERRITIN SERPL-MCNC: 435.5 NG/ML (ref 30–400)
HCT VFR BLD AUTO: 45.7 % (ref 37.5–51)
HGB BLD-MCNC: 15.5 G/DL (ref 13–17.7)
HGB RETIC QN AUTO: 39.3 PG (ref 29.8–36.1)
IMM GRANULOCYTES # BLD AUTO: 0.01 10*3/MM3 (ref 0–0.05)
IMM GRANULOCYTES NFR BLD AUTO: 0.2 % (ref 0–0.5)
IMM RETICS NFR: 4.3 % (ref 3–15.8)
LYMPHOCYTES # BLD AUTO: 1.47 10*3/MM3 (ref 0.7–3.1)
LYMPHOCYTES NFR BLD AUTO: 29.1 % (ref 19.6–45.3)
MCH RBC QN AUTO: 33.5 PG (ref 26.6–33)
MCHC RBC AUTO-ENTMCNC: 33.9 G/DL (ref 31.5–35.7)
MCV RBC AUTO: 98.7 FL (ref 79–97)
MONOCYTES # BLD AUTO: 0.4 10*3/MM3 (ref 0.1–0.9)
MONOCYTES NFR BLD AUTO: 7.9 % (ref 5–12)
NEUTROPHILS NFR BLD AUTO: 2.75 10*3/MM3 (ref 1.7–7)
NEUTROPHILS NFR BLD AUTO: 54.3 % (ref 42.7–76)
NRBC BLD AUTO-RTO: 0 /100 WBC (ref 0–0.2)
PLATELET # BLD AUTO: 110 10*3/MM3 (ref 140–450)
PMV BLD AUTO: 10.9 FL (ref 6–12)
RBC # BLD AUTO: 4.63 10*6/MM3 (ref 4.14–5.8)
RETICS/RBC NFR AUTO: 1 % (ref 0.7–1.9)
WBC # BLD AUTO: 5.06 10*3/MM3 (ref 3.4–10.8)

## 2020-07-28 PROCEDURE — 85046 RETICYTE/HGB CONCENTRATE: CPT

## 2020-07-28 PROCEDURE — 99195 PHLEBOTOMY: CPT

## 2020-07-28 PROCEDURE — 99213 OFFICE O/P EST LOW 20 MIN: CPT | Performed by: INTERNAL MEDICINE

## 2020-07-28 PROCEDURE — 85025 COMPLETE CBC W/AUTO DIFF WBC: CPT

## 2020-07-28 PROCEDURE — 36415 COLL VENOUS BLD VENIPUNCTURE: CPT

## 2020-07-28 PROCEDURE — 82728 ASSAY OF FERRITIN: CPT

## 2020-07-28 RX ORDER — SODIUM CHLORIDE 9 MG/ML
250 INJECTION, SOLUTION INTRAVENOUS ONCE
Status: CANCELLED
Start: 2020-08-04

## 2020-07-28 NOTE — PROGRESS NOTES
[facility] CBC GROUP OUTPATIENT FOLLOW UP CLINIC VISIT    REASON FOR FOLLOW-UP:    1.  Thrombocytopenia  2.  Elevated ferritin with newly diagnosed hereditary hemochromatosis on 8/14/2019.  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  Baseline ferritin in the office on 8/14/2019 is 929, highest 1063 on 10/9/2019.       HISTORY OF PRESENT ILLNESS:  Pablito Bergman is a 65 y.o. male who returns today for follow up of the above issue.      He's been having therapeutic phlebotomy about every three weeks, with improvement in his ferritin to 488 as of 7/1/2020.    He tolerates this very well.  No excessive bleeding issues.  Energy level is reasonable.          HEMATOLOGIC HISTORY:  He has a history of smoking.  Based on this, he has had recurrent CT imaging of his chest.  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  CT imaging of his chest from 6/20/2019 showed that his liver is more dense than his spleen.  This was previously present as well.  Based on this, there was some concern for the possibility of liver disease and hemochromatosis.     Labs on 6/18/2019 showed a platelet count of 115,000, hemoglobin 16.0 hematocrit 48.1%, and white blood cells 4.1 with a normal differential.  As of 6/27/2019 his ferritin was 866 with an iron of 135 and 63% iron saturation.         Previously, on 11/5/2018 his platelets were 117,000 with a hemoglobin of 16.0 and hematocrit 46.1%.     He is asymptomatic.  He continues to smoke.  He denies significant alcohol use.  He denies significant pain or inflammation.  He denies any family history of hemochromatosis.    ALLERGIES:  Allergies   Allergen Reactions   • Other Unknown - Low Severity     Red pepper headache reaction       MEDICATIONS:  The medication list has been reviewed with the patient by the medical assistant, and the list has been updated in the electronic medical record, which I reviewed.  Medication dosages and frequencies were confirmed to be  "accurate.    REVIEW OF SYSTEMS:  PAIN:  See Vital Signs below.  GENERAL:  No fevers, chills, night sweats, or unintended weight loss.  SKIN:  No rashes or non-healing lesions.  HEME/LYMPH:  No abnormal bleeding.  No palpable lymphadenopathy.  EYES:  No vision changes or diplopia.  ENT:  No sore throat or difficulty swallowing.  RESPIRATORY:  No cough, shortness of breath, hemoptysis, or wheezing.  CARDIOVASCULAR:  No chest pain, palpitations, orthopnea, or dyspnea on exertion.  GASTROINTESTINAL:  No abdominal pain, nausea, vomiting, constipation, diarrhea, melena, or hematochezia.  GENITOURINARY:  No dysuria or hematuria.  MUSCULOSKELETAL:  No joint pain, swelling, or erythema.  NEUROLOGIC:  No dizziness, loss of consciousness, or seizures.  PSYCHIATRIC:  No depression, anxiety, or mood changes.  7/28/2020 no change from prior except as noted      Vitals:    07/28/20 1429   BP: 127/86   Pulse: 69   Resp: 16   Temp: 97.1 °F (36.2 °C)   TempSrc: Temporal   SpO2: 96%   Weight: 72.6 kg (160 lb 1.6 oz)   Height: 190 cm (74.8\")   PainSc: 0-No pain  Comment: thrombocytopenia       PHYSICAL EXAMINATION:  General:  No acute distress, awake, alert and oriented  Skin:  Warm and dry, no visible rash  HEENT:  normocephalic/atraumatic.  Wearing a mask.  Chest:  Normal respiratory effort  Extremities:  No visible clubbing, cyanosis, or edema  Neuro/psych:  Grossly non-focal.  Normal mood and affect.        DIAGNOSTIC DATA:  Results for orders placed or performed in visit on 07/28/20   Retic With IRF & RET-He   Result Value Ref Range    Immature Reticulocyte Fraction 4.3 3.0 - 15.8 %    Reticulocyte % 1.00 0.70 - 1.90 %    Reticulocyte Hgb 39.3 (H) 29.8 - 36.1 pg   CBC Auto Differential   Result Value Ref Range    WBC 5.06 3.40 - 10.80 10*3/mm3    RBC 4.63 4.14 - 5.80 10*6/mm3    Hemoglobin 15.5 13.0 - 17.7 g/dL    Hematocrit 45.7 37.5 - 51.0 %    MCV 98.7 (H) 79.0 - 97.0 fL    MCH 33.5 (H) 26.6 - 33.0 pg    MCHC 33.9 31.5 - 35.7 " g/dL    RDW 12.3 12.3 - 15.4 %    RDW-SD 44.9 37.0 - 54.0 fl    MPV 10.9 6.0 - 12.0 fL    Platelets 110 (L) 140 - 450 10*3/mm3    Neutrophil % 54.3 42.7 - 76.0 %    Lymphocyte % 29.1 19.6 - 45.3 %    Monocyte % 7.9 5.0 - 12.0 %    Eosinophil % 7.1 (H) 0.3 - 6.2 %    Basophil % 1.4 0.0 - 1.5 %    Immature Grans % 0.2 0.0 - 0.5 %    Neutrophils, Absolute 2.75 1.70 - 7.00 10*3/mm3    Lymphocytes, Absolute 1.47 0.70 - 3.10 10*3/mm3    Monocytes, Absolute 0.40 0.10 - 0.90 10*3/mm3    Eosinophils, Absolute 0.36 0.00 - 0.40 10*3/mm3    Basophils, Absolute 0.07 0.00 - 0.20 10*3/mm3    Immature Grans, Absolute 0.01 0.00 - 0.05 10*3/mm3    nRBC 0.0 0.0 - 0.2 /100 WBC       IMAGING: None reviewed    ASSESSMENT:  This is a 65 y.o. male with:  1. Thrombocytopenia: This has been chronic.  I did not see any evidence for platelet clumping on his peripheral blood smear.  He is asymptomatic.  His IPF is slightly elevated at 8.7%.  Vitamin B12 normal at 304.  Continue to monitor.  Platelet count remains stable today at 110,000.    2. Hereditary hemochromatosis:  He is a homozygote for the HFE gene C282Y mutation.  Baseline ferritin in the office on 8/14/2019 is 929.  I gave him a copy of the lab work today and educated him regarding hemochromatosis.  I advised him that some people with his genotype are more affected than others.  Considering that his ferritin is only in the 900s at age 64 I do not think he has been accumulating iron that rapidly throughout his life.  I advised him that we need to get rid of the iron with therapeutic phlebotomy.  At some point we may obtain MRI imaging of his liver to look for iron deposition and we will need to check an alpha-fetoprotein level at some point as well.     Through 2/26/2020 he was receiving therapeutic phlebotomy every 3 to 4 weeks.  His ferritin had plateaued in the low 600s.  He was not coming into the office for a while due to COVID-19.  Ferritin has been improving, last at 488 on  7/1/2020.  Follow-up ferritin from today.    PLAN:  1.  Therapeutic phlebotomy today and about every 3 weeks with a goal ferritin of .  I will see him back in about 4 months with labs and an appointment for therapeutic phlebotomy if appropriate at that time.

## 2020-07-30 ENCOUNTER — HOSPITAL ENCOUNTER (OUTPATIENT)
Dept: CT IMAGING | Facility: HOSPITAL | Age: 65
Discharge: HOME | End: 2020-07-30
Admitting: INTERNAL MEDICINE

## 2020-07-30 DIAGNOSIS — R91.1 PULMONARY NODULE, LEFT: ICD-10-CM

## 2020-07-30 PROCEDURE — 71250 CT THORAX DX C-: CPT

## 2020-08-12 ENCOUNTER — TRANSCRIBE ORDERS (OUTPATIENT)
Dept: ADMINISTRATIVE | Facility: HOSPITAL | Age: 65
End: 2020-08-12

## 2020-08-12 DIAGNOSIS — R91.1 PULMONARY NODULE: Primary | ICD-10-CM

## 2020-08-18 ENCOUNTER — INFUSION (OUTPATIENT)
Dept: ONCOLOGY | Facility: HOSPITAL | Age: 65
End: 2020-08-18

## 2020-08-18 ENCOUNTER — LAB (OUTPATIENT)
Dept: LAB | Facility: HOSPITAL | Age: 65
End: 2020-08-18

## 2020-08-18 VITALS
RESPIRATION RATE: 16 BRPM | OXYGEN SATURATION: 96 % | BODY MASS INDEX: 19.95 KG/M2 | DIASTOLIC BLOOD PRESSURE: 70 MMHG | TEMPERATURE: 98.9 F | SYSTOLIC BLOOD PRESSURE: 117 MMHG | WEIGHT: 158.8 LBS | HEART RATE: 71 BPM

## 2020-08-18 DIAGNOSIS — E83.110 HEREDITARY HEMOCHROMATOSIS (HCC): Primary | ICD-10-CM

## 2020-08-18 DIAGNOSIS — E83.110 HEREDITARY HEMOCHROMATOSIS (HCC): ICD-10-CM

## 2020-08-18 LAB
BASOPHILS # BLD AUTO: 0.09 10*3/MM3 (ref 0–0.2)
BASOPHILS NFR BLD AUTO: 1.6 % (ref 0–1.5)
DEPRECATED RDW RBC AUTO: 44.3 FL (ref 37–54)
EOSINOPHIL # BLD AUTO: 0.42 10*3/MM3 (ref 0–0.4)
EOSINOPHIL NFR BLD AUTO: 7.6 % (ref 0.3–6.2)
ERYTHROCYTE [DISTWIDTH] IN BLOOD BY AUTOMATED COUNT: 12.2 % (ref 12.3–15.4)
FERRITIN SERPL-MCNC: 470.3 NG/ML (ref 30–400)
HCT VFR BLD AUTO: 47.1 % (ref 37.5–51)
HGB BLD-MCNC: 15.8 G/DL (ref 13–17.7)
HGB RETIC QN AUTO: 38.5 PG (ref 29.8–36.1)
IMM GRANULOCYTES # BLD AUTO: 0.02 10*3/MM3 (ref 0–0.05)
IMM GRANULOCYTES NFR BLD AUTO: 0.4 % (ref 0–0.5)
IMM RETICS NFR: 4.2 % (ref 3–15.8)
IRON 24H UR-MRATE: 197 MCG/DL (ref 59–158)
IRON SATN MFR SERPL: 79 % (ref 14–48)
LYMPHOCYTES # BLD AUTO: 1.97 10*3/MM3 (ref 0.7–3.1)
LYMPHOCYTES NFR BLD AUTO: 35.8 % (ref 19.6–45.3)
MCH RBC QN AUTO: 33.1 PG (ref 26.6–33)
MCHC RBC AUTO-ENTMCNC: 33.5 G/DL (ref 31.5–35.7)
MCV RBC AUTO: 98.5 FL (ref 79–97)
MONOCYTES # BLD AUTO: 0.41 10*3/MM3 (ref 0.1–0.9)
MONOCYTES NFR BLD AUTO: 7.5 % (ref 5–12)
NEUTROPHILS NFR BLD AUTO: 2.59 10*3/MM3 (ref 1.7–7)
NEUTROPHILS NFR BLD AUTO: 47.1 % (ref 42.7–76)
NRBC BLD AUTO-RTO: 0 /100 WBC (ref 0–0.2)
PLATELET # BLD AUTO: 139 10*3/MM3 (ref 140–450)
PMV BLD AUTO: 10.6 FL (ref 6–12)
RBC # BLD AUTO: 4.78 10*6/MM3 (ref 4.14–5.8)
RETICS # AUTO: 0.05 10*6/MM3 (ref 0.02–0.13)
RETICS/RBC NFR AUTO: 1.08 % (ref 0.7–1.9)
TIBC SERPL-MCNC: 251 MCG/DL (ref 249–505)
TRANSFERRIN SERPL-MCNC: 179 MG/DL (ref 200–360)
WBC # BLD AUTO: 5.5 10*3/MM3 (ref 3.4–10.8)

## 2020-08-18 PROCEDURE — 82728 ASSAY OF FERRITIN: CPT

## 2020-08-18 PROCEDURE — 84466 ASSAY OF TRANSFERRIN: CPT

## 2020-08-18 PROCEDURE — 83540 ASSAY OF IRON: CPT

## 2020-08-18 PROCEDURE — 85046 RETICYTE/HGB CONCENTRATE: CPT

## 2020-08-18 PROCEDURE — 85025 COMPLETE CBC W/AUTO DIFF WBC: CPT

## 2020-08-18 PROCEDURE — 99195 PHLEBOTOMY: CPT

## 2020-08-18 PROCEDURE — 36415 COLL VENOUS BLD VENIPUNCTURE: CPT

## 2020-08-18 RX ORDER — SODIUM CHLORIDE 9 MG/ML
250 INJECTION, SOLUTION INTRAVENOUS ONCE
Status: CANCELLED
Start: 2020-08-25

## 2020-09-03 ENCOUNTER — TELEPHONE (OUTPATIENT)
Dept: ONCOLOGY | Facility: CLINIC | Age: 65
End: 2020-09-03

## 2020-09-03 NOTE — TELEPHONE ENCOUNTER
Patient wanting to re-schedule his 9/9/20 appointment to sometime after 9/11/20. His callback is [phone]

## 2020-09-09 ENCOUNTER — APPOINTMENT (OUTPATIENT)
Dept: ONCOLOGY | Facility: HOSPITAL | Age: 65
End: 2020-09-09

## 2020-09-09 ENCOUNTER — APPOINTMENT (OUTPATIENT)
Dept: LAB | Facility: HOSPITAL | Age: 65
End: 2020-09-09

## 2020-09-10 RX ORDER — SODIUM CHLORIDE 9 MG/ML
250 INJECTION, SOLUTION INTRAVENOUS ONCE
Status: CANCELLED | OUTPATIENT
Start: 2020-09-10

## 2020-09-14 ENCOUNTER — LAB (OUTPATIENT)
Dept: LAB | Facility: HOSPITAL | Age: 65
End: 2020-09-14

## 2020-09-14 ENCOUNTER — INFUSION (OUTPATIENT)
Dept: ONCOLOGY | Facility: HOSPITAL | Age: 65
End: 2020-09-14

## 2020-09-14 VITALS
TEMPERATURE: 97.3 F | WEIGHT: 160.4 LBS | OXYGEN SATURATION: 97 % | SYSTOLIC BLOOD PRESSURE: 112 MMHG | HEART RATE: 72 BPM | DIASTOLIC BLOOD PRESSURE: 80 MMHG | RESPIRATION RATE: 16 BRPM | BODY MASS INDEX: 20.15 KG/M2

## 2020-09-14 DIAGNOSIS — E83.110 HEREDITARY HEMOCHROMATOSIS (HCC): Primary | ICD-10-CM

## 2020-09-14 DIAGNOSIS — E83.110 HEREDITARY HEMOCHROMATOSIS (HCC): ICD-10-CM

## 2020-09-14 LAB
BASOPHILS # BLD AUTO: 0.08 10*3/MM3 (ref 0–0.2)
BASOPHILS NFR BLD AUTO: 1.4 % (ref 0–1.5)
DEPRECATED RDW RBC AUTO: 45.8 FL (ref 37–54)
EOSINOPHIL # BLD AUTO: 0.34 10*3/MM3 (ref 0–0.4)
EOSINOPHIL NFR BLD AUTO: 5.9 % (ref 0.3–6.2)
ERYTHROCYTE [DISTWIDTH] IN BLOOD BY AUTOMATED COUNT: 12.5 % (ref 12.3–15.4)
FERRITIN SERPL-MCNC: 458.8 NG/ML (ref 30–400)
HCT VFR BLD AUTO: 47 % (ref 37.5–51)
HGB BLD-MCNC: 15.7 G/DL (ref 13–17.7)
HGB RETIC QN AUTO: 39.2 PG (ref 29.8–36.1)
IMM GRANULOCYTES # BLD AUTO: 0.01 10*3/MM3 (ref 0–0.05)
IMM GRANULOCYTES NFR BLD AUTO: 0.2 % (ref 0–0.5)
IMM RETICS NFR: 5.9 % (ref 3–15.8)
IRON 24H UR-MRATE: 202 MCG/DL (ref 59–158)
IRON SATN MFR SERPL: 85 % (ref 14–48)
LYMPHOCYTES # BLD AUTO: 1.65 10*3/MM3 (ref 0.7–3.1)
LYMPHOCYTES NFR BLD AUTO: 28.8 % (ref 19.6–45.3)
MCH RBC QN AUTO: 32.9 PG (ref 26.6–33)
MCHC RBC AUTO-ENTMCNC: 33.4 G/DL (ref 31.5–35.7)
MCV RBC AUTO: 98.5 FL (ref 79–97)
MONOCYTES # BLD AUTO: 0.49 10*3/MM3 (ref 0.1–0.9)
MONOCYTES NFR BLD AUTO: 8.6 % (ref 5–12)
NEUTROPHILS NFR BLD AUTO: 3.15 10*3/MM3 (ref 1.7–7)
NEUTROPHILS NFR BLD AUTO: 55.1 % (ref 42.7–76)
NRBC BLD AUTO-RTO: 0 /100 WBC (ref 0–0.2)
PLATELET # BLD AUTO: 116 10*3/MM3 (ref 140–450)
PMV BLD AUTO: 11.3 FL (ref 6–12)
RBC # BLD AUTO: 4.77 10*6/MM3 (ref 4.14–5.8)
RETICS # AUTO: 0.06 10*6/MM3 (ref 0.02–0.13)
RETICS/RBC NFR AUTO: 1.2 % (ref 0.7–1.9)
TIBC SERPL-MCNC: 237 MCG/DL (ref 249–505)
TRANSFERRIN SERPL-MCNC: 169 MG/DL (ref 200–360)
WBC # BLD AUTO: 5.72 10*3/MM3 (ref 3.4–10.8)

## 2020-09-14 PROCEDURE — 36415 COLL VENOUS BLD VENIPUNCTURE: CPT

## 2020-09-14 PROCEDURE — 99195 PHLEBOTOMY: CPT

## 2020-09-14 PROCEDURE — 85025 COMPLETE CBC W/AUTO DIFF WBC: CPT

## 2020-09-14 PROCEDURE — 84466 ASSAY OF TRANSFERRIN: CPT

## 2020-09-14 PROCEDURE — 85046 RETICYTE/HGB CONCENTRATE: CPT

## 2020-09-14 PROCEDURE — 82728 ASSAY OF FERRITIN: CPT

## 2020-09-14 PROCEDURE — 83540 ASSAY OF IRON: CPT

## 2020-09-14 RX ORDER — SODIUM CHLORIDE 9 MG/ML
250 INJECTION, SOLUTION INTRAVENOUS ONCE
Status: CANCELLED | OUTPATIENT
Start: 2020-09-14

## 2020-09-29 ENCOUNTER — LAB (OUTPATIENT)
Dept: LAB | Facility: HOSPITAL | Age: 65
End: 2020-09-29

## 2020-09-29 ENCOUNTER — INFUSION (OUTPATIENT)
Dept: ONCOLOGY | Facility: HOSPITAL | Age: 65
End: 2020-09-29

## 2020-09-29 VITALS — SYSTOLIC BLOOD PRESSURE: 133 MMHG | DIASTOLIC BLOOD PRESSURE: 87 MMHG | HEART RATE: 64 BPM

## 2020-09-29 DIAGNOSIS — E83.110 HEREDITARY HEMOCHROMATOSIS (HCC): ICD-10-CM

## 2020-09-29 DIAGNOSIS — E83.110 HEREDITARY HEMOCHROMATOSIS (HCC): Primary | ICD-10-CM

## 2020-09-29 LAB
BASOPHILS # BLD AUTO: 0.08 10*3/MM3 (ref 0–0.2)
BASOPHILS NFR BLD AUTO: 1.1 % (ref 0–1.5)
DEPRECATED RDW RBC AUTO: 47.9 FL (ref 37–54)
EOSINOPHIL # BLD AUTO: 0.48 10*3/MM3 (ref 0–0.4)
EOSINOPHIL NFR BLD AUTO: 6.8 % (ref 0.3–6.2)
ERYTHROCYTE [DISTWIDTH] IN BLOOD BY AUTOMATED COUNT: 12.9 % (ref 12.3–15.4)
FERRITIN SERPL-MCNC: 395.5 NG/ML (ref 30–400)
HCT VFR BLD AUTO: 47 % (ref 37.5–51)
HGB BLD-MCNC: 15.6 G/DL (ref 13–17.7)
HGB RETIC QN AUTO: 38.2 PG (ref 29.8–36.1)
IMM GRANULOCYTES # BLD AUTO: 0.01 10*3/MM3 (ref 0–0.05)
IMM GRANULOCYTES NFR BLD AUTO: 0.1 % (ref 0–0.5)
IMM RETICS NFR: 7.6 % (ref 3–15.8)
IRON 24H UR-MRATE: 124 MCG/DL (ref 59–158)
IRON SATN MFR SERPL: 49 % (ref 14–48)
LYMPHOCYTES # BLD AUTO: 1.76 10*3/MM3 (ref 0.7–3.1)
LYMPHOCYTES NFR BLD AUTO: 25 % (ref 19.6–45.3)
MCH RBC QN AUTO: 33.4 PG (ref 26.6–33)
MCHC RBC AUTO-ENTMCNC: 33.2 G/DL (ref 31.5–35.7)
MCV RBC AUTO: 100.6 FL (ref 79–97)
MONOCYTES # BLD AUTO: 0.58 10*3/MM3 (ref 0.1–0.9)
MONOCYTES NFR BLD AUTO: 8.2 % (ref 5–12)
NEUTROPHILS NFR BLD AUTO: 4.13 10*3/MM3 (ref 1.7–7)
NEUTROPHILS NFR BLD AUTO: 58.8 % (ref 42.7–76)
NRBC BLD AUTO-RTO: 0 /100 WBC (ref 0–0.2)
PLATELET # BLD AUTO: 134 10*3/MM3 (ref 140–450)
PMV BLD AUTO: 10.9 FL (ref 6–12)
RBC # BLD AUTO: 4.67 10*6/MM3 (ref 4.14–5.8)
RETICS # AUTO: 0.07 10*6/MM3 (ref 0.02–0.13)
RETICS/RBC NFR AUTO: 1.41 % (ref 0.7–1.9)
TIBC SERPL-MCNC: 255 MCG/DL (ref 249–505)
TRANSFERRIN SERPL-MCNC: 182 MG/DL (ref 200–360)
WBC # BLD AUTO: 7.04 10*3/MM3 (ref 3.4–10.8)

## 2020-09-29 PROCEDURE — 36415 COLL VENOUS BLD VENIPUNCTURE: CPT

## 2020-09-29 PROCEDURE — 83540 ASSAY OF IRON: CPT

## 2020-09-29 PROCEDURE — 84466 ASSAY OF TRANSFERRIN: CPT

## 2020-09-29 PROCEDURE — 85046 RETICYTE/HGB CONCENTRATE: CPT

## 2020-09-29 PROCEDURE — 82728 ASSAY OF FERRITIN: CPT

## 2020-09-29 PROCEDURE — 99195 PHLEBOTOMY: CPT

## 2020-09-29 PROCEDURE — 85025 COMPLETE CBC W/AUTO DIFF WBC: CPT

## 2020-09-29 RX ORDER — SODIUM CHLORIDE 9 MG/ML
250 INJECTION, SOLUTION INTRAVENOUS ONCE
Status: CANCELLED | OUTPATIENT
Start: 2020-09-29

## 2020-09-29 NOTE — NURSING NOTE
Since ferritin has been so far above goal range of , ok for pt to just come to infusion for phlebotomy 10/20/20.  RN to draw CBC and ferritin with start of phlebotomy that day.  Per Dr. Delgado, no need to wait for CBC or ferritin results 10/20/20.  He knows pt will need phlebo that day regardless.  This will save the pt unnecessary sticks.  Called Maria Victoria at appt desk and she will put pt on for phlebo only 10/20/20. No lab appt needed that day.      Informed pt and he v/u. Copy of new schedule given.

## 2020-10-21 ENCOUNTER — TELEPHONE (OUTPATIENT)
Dept: ONCOLOGY | Facility: CLINIC | Age: 65
End: 2020-10-21

## 2020-10-21 NOTE — TELEPHONE ENCOUNTER
Caller: Pablito     Relationship to patient: Self    Best call back number: [phone]    Type of visit: Lab and infusion    Requested date: Anyday, anytime     If rescheduling, when is the original appointment: 10/20

## 2020-10-23 ENCOUNTER — LAB (OUTPATIENT)
Dept: LAB | Facility: HOSPITAL | Age: 65
End: 2020-10-23

## 2020-10-23 ENCOUNTER — INFUSION (OUTPATIENT)
Dept: ONCOLOGY | Facility: HOSPITAL | Age: 65
End: 2020-10-23

## 2020-10-23 VITALS
WEIGHT: 161.4 LBS | DIASTOLIC BLOOD PRESSURE: 87 MMHG | BODY MASS INDEX: 20.28 KG/M2 | OXYGEN SATURATION: 98 % | SYSTOLIC BLOOD PRESSURE: 149 MMHG | HEART RATE: 68 BPM | TEMPERATURE: 98 F | RESPIRATION RATE: 16 BRPM

## 2020-10-23 DIAGNOSIS — E83.110 HEREDITARY HEMOCHROMATOSIS (HCC): Primary | ICD-10-CM

## 2020-10-23 LAB
BASOPHILS # BLD AUTO: 0.08 10*3/MM3 (ref 0–0.2)
BASOPHILS NFR BLD AUTO: 1.6 % (ref 0–1.5)
DEPRECATED RDW RBC AUTO: 45.9 FL (ref 37–54)
EOSINOPHIL # BLD AUTO: 0.46 10*3/MM3 (ref 0–0.4)
EOSINOPHIL NFR BLD AUTO: 9.2 % (ref 0.3–6.2)
ERYTHROCYTE [DISTWIDTH] IN BLOOD BY AUTOMATED COUNT: 12.2 % (ref 12.3–15.4)
FERRITIN SERPL-MCNC: 425.5 NG/ML (ref 30–400)
HCT VFR BLD AUTO: 50.8 % (ref 37.5–51)
HGB BLD-MCNC: 16.7 G/DL (ref 13–17.7)
HGB RETIC QN AUTO: 38.4 PG (ref 29.8–36.1)
IMM GRANULOCYTES # BLD AUTO: 0 10*3/MM3 (ref 0–0.05)
IMM GRANULOCYTES NFR BLD AUTO: 0 % (ref 0–0.5)
IMM RETICS NFR: 4.4 % (ref 3–15.8)
IRON 24H UR-MRATE: 226 MCG/DL (ref 59–158)
IRON SATN MFR SERPL: 84 % (ref 14–48)
LYMPHOCYTES # BLD AUTO: 1.39 10*3/MM3 (ref 0.7–3.1)
LYMPHOCYTES NFR BLD AUTO: 27.9 % (ref 19.6–45.3)
MCH RBC QN AUTO: 32.8 PG (ref 26.6–33)
MCHC RBC AUTO-ENTMCNC: 32.9 G/DL (ref 31.5–35.7)
MCV RBC AUTO: 99.8 FL (ref 79–97)
MONOCYTES # BLD AUTO: 0.5 10*3/MM3 (ref 0.1–0.9)
MONOCYTES NFR BLD AUTO: 10 % (ref 5–12)
NEUTROPHILS NFR BLD AUTO: 2.56 10*3/MM3 (ref 1.7–7)
NEUTROPHILS NFR BLD AUTO: 51.3 % (ref 42.7–76)
NRBC BLD AUTO-RTO: 0 /100 WBC (ref 0–0.2)
PLATELET # BLD AUTO: 128 10*3/MM3 (ref 140–450)
PMV BLD AUTO: 11.4 FL (ref 6–12)
RBC # BLD AUTO: 5.09 10*6/MM3 (ref 4.14–5.8)
RETICS # AUTO: 0.05 10*6/MM3 (ref 0.02–0.13)
RETICS/RBC NFR AUTO: 1.02 % (ref 0.7–1.9)
TIBC SERPL-MCNC: 270 MCG/DL (ref 249–505)
TRANSFERRIN SERPL-MCNC: 193 MG/DL (ref 200–360)
WBC # BLD AUTO: 4.99 10*3/MM3 (ref 3.4–10.8)

## 2020-10-23 PROCEDURE — 85025 COMPLETE CBC W/AUTO DIFF WBC: CPT

## 2020-10-23 PROCEDURE — 82728 ASSAY OF FERRITIN: CPT

## 2020-10-23 PROCEDURE — 83540 ASSAY OF IRON: CPT

## 2020-10-23 PROCEDURE — 84466 ASSAY OF TRANSFERRIN: CPT

## 2020-10-23 PROCEDURE — 99195 PHLEBOTOMY: CPT

## 2020-10-23 PROCEDURE — 85046 RETICYTE/HGB CONCENTRATE: CPT

## 2020-10-23 RX ORDER — SODIUM CHLORIDE 9 MG/ML
250 INJECTION, SOLUTION INTRAVENOUS ONCE
Status: CANCELLED | OUTPATIENT
Start: 2020-10-23

## 2020-11-10 ENCOUNTER — INFUSION (OUTPATIENT)
Dept: ONCOLOGY | Facility: HOSPITAL | Age: 65
End: 2020-11-10

## 2020-11-10 ENCOUNTER — LAB (OUTPATIENT)
Dept: LAB | Facility: HOSPITAL | Age: 65
End: 2020-11-10

## 2020-11-10 ENCOUNTER — OFFICE VISIT (OUTPATIENT)
Dept: ONCOLOGY | Facility: CLINIC | Age: 65
End: 2020-11-10

## 2020-11-10 VITALS
BODY MASS INDEX: 20.19 KG/M2 | HEIGHT: 75 IN | OXYGEN SATURATION: 97 % | TEMPERATURE: 97.8 F | HEART RATE: 69 BPM | DIASTOLIC BLOOD PRESSURE: 86 MMHG | RESPIRATION RATE: 16 BRPM | SYSTOLIC BLOOD PRESSURE: 127 MMHG | WEIGHT: 162.4 LBS

## 2020-11-10 DIAGNOSIS — E83.110 HEREDITARY HEMOCHROMATOSIS (HCC): ICD-10-CM

## 2020-11-10 DIAGNOSIS — D69.6 THROMBOCYTOPENIA (HCC): ICD-10-CM

## 2020-11-10 DIAGNOSIS — E83.110 HEREDITARY HEMOCHROMATOSIS (HCC): Primary | ICD-10-CM

## 2020-11-10 LAB
ALBUMIN SERPL-MCNC: 4.2 G/DL (ref 3.5–5.2)
ALBUMIN/GLOB SERPL: 2.2 G/DL (ref 1.1–2.4)
ALP SERPL-CCNC: 65 U/L (ref 38–116)
ALT SERPL W P-5'-P-CCNC: 14 U/L (ref 0–41)
ANION GAP SERPL CALCULATED.3IONS-SCNC: 9.6 MMOL/L (ref 5–15)
AST SERPL-CCNC: 21 U/L (ref 0–40)
BASOPHILS # BLD AUTO: 0.09 10*3/MM3 (ref 0–0.2)
BASOPHILS NFR BLD AUTO: 1.8 % (ref 0–1.5)
BILIRUB SERPL-MCNC: 0.6 MG/DL (ref 0.2–1.2)
BUN SERPL-MCNC: 22 MG/DL (ref 6–20)
BUN/CREAT SERPL: 24.4 (ref 7.3–30)
CALCIUM SPEC-SCNC: 9.4 MG/DL (ref 8.5–10.2)
CHLORIDE SERPL-SCNC: 106 MMOL/L (ref 98–107)
CO2 SERPL-SCNC: 25.4 MMOL/L (ref 22–29)
CREAT SERPL-MCNC: 0.9 MG/DL (ref 0.7–1.3)
DEPRECATED RDW RBC AUTO: 44.6 FL (ref 37–54)
EOSINOPHIL # BLD AUTO: 0.35 10*3/MM3 (ref 0–0.4)
EOSINOPHIL NFR BLD AUTO: 6.8 % (ref 0.3–6.2)
ERYTHROCYTE [DISTWIDTH] IN BLOOD BY AUTOMATED COUNT: 12.1 % (ref 12.3–15.4)
FERRITIN SERPL-MCNC: 312.5 NG/ML (ref 30–400)
GFR SERPL CREATININE-BSD FRML MDRD: 84 ML/MIN/1.73
GLOBULIN UR ELPH-MCNC: 1.9 GM/DL (ref 1.8–3.5)
GLUCOSE SERPL-MCNC: 135 MG/DL (ref 74–124)
HCT VFR BLD AUTO: 44 % (ref 37.5–51)
HGB BLD-MCNC: 14.6 G/DL (ref 13–17.7)
HGB RETIC QN AUTO: 38.2 PG (ref 29.8–36.1)
IMM GRANULOCYTES # BLD AUTO: 0.01 10*3/MM3 (ref 0–0.05)
IMM GRANULOCYTES NFR BLD AUTO: 0.2 % (ref 0–0.5)
IMM RETICS NFR: 4.4 % (ref 3–15.8)
LYMPHOCYTES # BLD AUTO: 1.34 10*3/MM3 (ref 0.7–3.1)
LYMPHOCYTES NFR BLD AUTO: 26.2 % (ref 19.6–45.3)
MCH RBC QN AUTO: 32.9 PG (ref 26.6–33)
MCHC RBC AUTO-ENTMCNC: 33.2 G/DL (ref 31.5–35.7)
MCV RBC AUTO: 99.1 FL (ref 79–97)
MONOCYTES # BLD AUTO: 0.4 10*3/MM3 (ref 0.1–0.9)
MONOCYTES NFR BLD AUTO: 7.8 % (ref 5–12)
NEUTROPHILS NFR BLD AUTO: 2.92 10*3/MM3 (ref 1.7–7)
NEUTROPHILS NFR BLD AUTO: 57.2 % (ref 42.7–76)
NRBC BLD AUTO-RTO: 0 /100 WBC (ref 0–0.2)
PLATELET # BLD AUTO: 114 10*3/MM3 (ref 140–450)
PMV BLD AUTO: 10.8 FL (ref 6–12)
POTASSIUM SERPL-SCNC: 4.1 MMOL/L (ref 3.5–4.7)
PROT SERPL-MCNC: 6.1 G/DL (ref 6.3–8)
RBC # BLD AUTO: 4.44 10*6/MM3 (ref 4.14–5.8)
RETICS # AUTO: 0.05 10*6/MM3 (ref 0.02–0.13)
RETICS/RBC NFR AUTO: 1.15 % (ref 0.7–1.9)
SODIUM SERPL-SCNC: 141 MMOL/L (ref 134–145)
WBC # BLD AUTO: 5.11 10*3/MM3 (ref 3.4–10.8)

## 2020-11-10 PROCEDURE — 82728 ASSAY OF FERRITIN: CPT

## 2020-11-10 PROCEDURE — 36415 COLL VENOUS BLD VENIPUNCTURE: CPT

## 2020-11-10 PROCEDURE — 85046 RETICYTE/HGB CONCENTRATE: CPT

## 2020-11-10 PROCEDURE — 99214 OFFICE O/P EST MOD 30 MIN: CPT | Performed by: INTERNAL MEDICINE

## 2020-11-10 PROCEDURE — 85025 COMPLETE CBC W/AUTO DIFF WBC: CPT

## 2020-11-10 PROCEDURE — 80053 COMPREHEN METABOLIC PANEL: CPT | Performed by: INTERNAL MEDICINE

## 2020-11-10 PROCEDURE — 99195 PHLEBOTOMY: CPT

## 2020-11-10 NOTE — PROGRESS NOTES
[facility] GROUP OUTPATIENT FOLLOW UP CLINIC VISIT    REASON FOR FOLLOW-UP:    1.  Thrombocytopenia  2.  Elevated ferritin with newly diagnosed hereditary hemochromatosis on 8/14/2019.  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  Baseline ferritin in the office on 8/14/2019 is 929, highest 1063 on 10/9/2019.       HISTORY OF PRESENT ILLNESS:  Pablito Bergman is a 65 y.o. male who returns today for follow up of the above issue.      He's been having therapeutic phlebotomy about every three weeks, with improvement in his ferritin to 425 as of 10/23/2020.  Prior to that his ferritin was 395 on 9/29/2020.    He tolerates phlebotomy very well.  There is no lightheadedness or syncope.  He has not noticed any difference in how he feels.  Energy level remains good.  No abdominal pain.  No shortness of breath.      HEMATOLOGIC HISTORY:  He has a history of smoking.  Based on this, he has had recurrent CT imaging of his chest.  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  CT imaging of his chest from 6/20/2019 showed that his liver is more dense than his spleen.  This was previously present as well.  Based on this, there was some concern for the possibility of liver disease and hemochromatosis.     Labs on 6/18/2019 showed a platelet count of 115,000, hemoglobin 16.0 hematocrit 48.1%, and white blood cells 4.1 with a normal differential.  As of 6/27/2019 his ferritin was 866 with an iron of 135 and 63% iron saturation.         Previously, on 11/5/2018 his platelets were 117,000 with a hemoglobin of 16.0 and hematocrit 46.1%.     He is asymptomatic.  He continues to smoke.  He denies significant alcohol use.  He denies significant pain or inflammation.  He denies any family history of hemochromatosis.    ALLERGIES:  Allergies   Allergen Reactions   • Other Unknown - Low Severity     Red pepper headache reaction       MEDICATIONS:  The medication list has been reviewed with the patient by the  "medical assistant, and the list has been updated in the electronic medical record, which I reviewed.  Medication dosages and frequencies were confirmed to be accurate.    REVIEW OF SYSTEMS:  PAIN:  See Vital Signs below.  GENERAL:  No fevers, chills, night sweats, or unintended weight loss.  SKIN:  No rashes or non-healing lesions.  HEME/LYMPH:  No abnormal bleeding.  No palpable lymphadenopathy.  EYES:  No vision changes or diplopia.  ENT:  No sore throat or difficulty swallowing.  RESPIRATORY:  No cough, shortness of breath, hemoptysis, or wheezing.  CARDIOVASCULAR:  No chest pain, palpitations, orthopnea, or dyspnea on exertion.  GASTROINTESTINAL:  No abdominal pain, nausea, vomiting, constipation, diarrhea, melena, or hematochezia.  GENITOURINARY:  No dysuria or hematuria.  MUSCULOSKELETAL:  No joint pain, swelling, or erythema.  NEUROLOGIC:  No dizziness, loss of consciousness, or seizures.  PSYCHIATRIC:  No depression, anxiety, or mood changes.  Reviewed 11/10/2020      Vitals:    11/10/20 1451   BP: 127/86   Pulse: 69   Resp: 16   Temp: 97.8 °F (36.6 °C)   TempSrc: Temporal   SpO2: 97%   Weight: 73.7 kg (162 lb 6.4 oz)   Height: 190 cm (74.8\")   PainSc: 0-No pain  Comment: hereditary hemochromatosis       PHYSICAL EXAMINATION:  General:  No acute distress, awake, alert and oriented  Skin:  Warm and dry, no visible rash  HEENT:  normocephalic/atraumatic.  Wearing a mask.  Chest:  Normal respiratory effort.  Lungs clear to auscultation bilaterally.  Heart: Regular rate and rhythm without murmurs gallops or rubs  Extremities:  No visible clubbing, cyanosis, or edema  Neuro/psych:  Grossly non-focal.  Normal mood and affect.        DIAGNOSTIC DATA:  Results for orders placed or performed in visit on 11/10/20   Retic With IRF & RET-He    Specimen: Blood   Result Value Ref Range    Immature Reticulocyte Fraction 4.4 3.0 - 15.8 %    Reticulocyte % 1.15 0.70 - 1.90 %    Reticulocyte Absolute 0.0511 0.0200 - 0.1300 " 10*6/mm3    Reticulocyte Hgb 38.2 (H) 29.8 - 36.1 pg   CBC Auto Differential    Specimen: Blood   Result Value Ref Range    WBC 5.11 3.40 - 10.80 10*3/mm3    RBC 4.44 4.14 - 5.80 10*6/mm3    Hemoglobin 14.6 13.0 - 17.7 g/dL    Hematocrit 44.0 37.5 - 51.0 %    MCV 99.1 (H) 79.0 - 97.0 fL    MCH 32.9 26.6 - 33.0 pg    MCHC 33.2 31.5 - 35.7 g/dL    RDW 12.1 (L) 12.3 - 15.4 %    RDW-SD 44.6 37.0 - 54.0 fl    MPV 10.8 6.0 - 12.0 fL    Platelets 114 (L) 140 - 450 10*3/mm3    Neutrophil % 57.2 42.7 - 76.0 %    Lymphocyte % 26.2 19.6 - 45.3 %    Monocyte % 7.8 5.0 - 12.0 %    Eosinophil % 6.8 (H) 0.3 - 6.2 %    Basophil % 1.8 (H) 0.0 - 1.5 %    Immature Grans % 0.2 0.0 - 0.5 %    Neutrophils, Absolute 2.92 1.70 - 7.00 10*3/mm3    Lymphocytes, Absolute 1.34 0.70 - 3.10 10*3/mm3    Monocytes, Absolute 0.40 0.10 - 0.90 10*3/mm3    Eosinophils, Absolute 0.35 0.00 - 0.40 10*3/mm3    Basophils, Absolute 0.09 0.00 - 0.20 10*3/mm3    Immature Grans, Absolute 0.01 0.00 - 0.05 10*3/mm3    nRBC 0.0 0.0 - 0.2 /100 WBC       IMAGING: None reviewed    ASSESSMENT:  This is a 65 y.o. male with:  1. Thrombocytopenia: This has been chronic.  I did not see any evidence for platelet clumping on his peripheral blood smear.  He is asymptomatic.  His IPF was slightly elevated at 8.7%.  Vitamin B12 was normal at 304.  Continue to monitor.  Platelet count remains stable today at 114,000.  No bleeding.    2. Hereditary hemochromatosis:  He is a homozygote for the HFE gene C282Y mutation.  Baseline ferritin in the office on 8/14/2019 is 929.    Ferritin was slow improvement with phlebotomy about every 2 to 3 weeks.  We may be making some progress as is ferritin has again decreased at 312.    Liver labs are normal today.    We will check an alpha-fetoprotein with his next set of labs in 3 weeks.    I discussed with him today the possibility of getting an MRI of his liver but he was not necessarily interested in this at this time.      PLAN:  1.   Therapeutic phlebotomy today and about every 2 weeks at this point until his ferritin gets closer to 50.  He is traveling in a couple of weeks.  Therefore, we will do a phlebotomy next week as well and then again in 3 weeks.  We will check an alpha-fetoprotein level in 3 weeks.  Otherwise we will check iron studies and a ferritin level with each phlebotomy.  I will see him back in a few months for follow-up.  He may require some imaging of his liver with an MRI, particularly if his alpha-fetoprotein level is elevated.

## 2020-11-17 ENCOUNTER — HOSPITAL ENCOUNTER (OUTPATIENT)
Dept: CT IMAGING | Facility: HOSPITAL | Age: 65
Discharge: HOME | End: 2020-11-17
Admitting: INTERNAL MEDICINE

## 2020-11-17 DIAGNOSIS — R91.1 PULMONARY NODULE: ICD-10-CM

## 2020-11-17 PROCEDURE — 71250 CT THORAX DX C-: CPT

## 2020-11-18 ENCOUNTER — INFUSION (OUTPATIENT)
Dept: ONCOLOGY | Facility: HOSPITAL | Age: 65
End: 2020-11-18

## 2020-11-18 ENCOUNTER — LAB (OUTPATIENT)
Dept: LAB | Facility: HOSPITAL | Age: 65
End: 2020-11-18

## 2020-11-18 VITALS
RESPIRATION RATE: 16 BRPM | SYSTOLIC BLOOD PRESSURE: 115 MMHG | OXYGEN SATURATION: 98 % | DIASTOLIC BLOOD PRESSURE: 75 MMHG | TEMPERATURE: 96.8 F | HEART RATE: 78 BPM | BODY MASS INDEX: 20.18 KG/M2 | WEIGHT: 160.6 LBS

## 2020-11-18 DIAGNOSIS — E83.110 HEREDITARY HEMOCHROMATOSIS (HCC): Primary | ICD-10-CM

## 2020-11-18 LAB
BASOPHILS # BLD AUTO: 0.07 10*3/MM3 (ref 0–0.2)
BASOPHILS NFR BLD AUTO: 1.2 % (ref 0–1.5)
DEPRECATED RDW RBC AUTO: 47.1 FL (ref 37–54)
EOSINOPHIL # BLD AUTO: 0.34 10*3/MM3 (ref 0–0.4)
EOSINOPHIL NFR BLD AUTO: 6 % (ref 0.3–6.2)
ERYTHROCYTE [DISTWIDTH] IN BLOOD BY AUTOMATED COUNT: 12.5 % (ref 12.3–15.4)
FERRITIN SERPL-MCNC: 295.7 NG/ML (ref 30–400)
HCT VFR BLD AUTO: 43.5 % (ref 37.5–51)
HGB BLD-MCNC: 14.5 G/DL (ref 13–17.7)
HGB RETIC QN AUTO: 39.4 PG (ref 29.8–36.1)
IMM GRANULOCYTES # BLD AUTO: 0 10*3/MM3 (ref 0–0.05)
IMM GRANULOCYTES NFR BLD AUTO: 0 % (ref 0–0.5)
IMM RETICS NFR: 6.3 % (ref 3–15.8)
IRON 24H UR-MRATE: 128 MCG/DL (ref 59–158)
IRON SATN MFR SERPL: 51 % (ref 14–48)
LYMPHOCYTES # BLD AUTO: 1.35 10*3/MM3 (ref 0.7–3.1)
LYMPHOCYTES NFR BLD AUTO: 23.8 % (ref 19.6–45.3)
MCH RBC QN AUTO: 33.5 PG (ref 26.6–33)
MCHC RBC AUTO-ENTMCNC: 33.3 G/DL (ref 31.5–35.7)
MCV RBC AUTO: 100.5 FL (ref 79–97)
MONOCYTES # BLD AUTO: 0.46 10*3/MM3 (ref 0.1–0.9)
MONOCYTES NFR BLD AUTO: 8.1 % (ref 5–12)
NEUTROPHILS NFR BLD AUTO: 3.46 10*3/MM3 (ref 1.7–7)
NEUTROPHILS NFR BLD AUTO: 60.9 % (ref 42.7–76)
NRBC BLD AUTO-RTO: 0 /100 WBC (ref 0–0.2)
PLATELET # BLD AUTO: 134 10*3/MM3 (ref 140–450)
PMV BLD AUTO: 10.2 FL (ref 6–12)
RBC # BLD AUTO: 4.33 10*6/MM3 (ref 4.14–5.8)
RETICS # AUTO: 0.07 10*6/MM3 (ref 0.02–0.13)
RETICS/RBC NFR AUTO: 1.5 % (ref 0.7–1.9)
TIBC SERPL-MCNC: 249 MCG/DL (ref 249–505)
TRANSFERRIN SERPL-MCNC: 178 MG/DL (ref 200–360)
WBC # BLD AUTO: 5.68 10*3/MM3 (ref 3.4–10.8)

## 2020-11-18 PROCEDURE — 85025 COMPLETE CBC W/AUTO DIFF WBC: CPT

## 2020-11-18 PROCEDURE — 99195 PHLEBOTOMY: CPT

## 2020-11-18 PROCEDURE — 85046 RETICYTE/HGB CONCENTRATE: CPT

## 2020-11-18 PROCEDURE — 84466 ASSAY OF TRANSFERRIN: CPT

## 2020-11-18 PROCEDURE — 82728 ASSAY OF FERRITIN: CPT

## 2020-11-18 PROCEDURE — 83540 ASSAY OF IRON: CPT

## 2020-11-18 RX ORDER — SODIUM CHLORIDE 9 MG/ML
250 INJECTION, SOLUTION INTRAVENOUS ONCE
Status: CANCELLED | OUTPATIENT
Start: 2020-11-18

## 2020-12-07 ENCOUNTER — LAB (OUTPATIENT)
Dept: LAB | Facility: HOSPITAL | Age: 65
End: 2020-12-07

## 2020-12-07 ENCOUNTER — INFUSION (OUTPATIENT)
Dept: ONCOLOGY | Facility: HOSPITAL | Age: 65
End: 2020-12-07

## 2020-12-07 VITALS
BODY MASS INDEX: 20.53 KG/M2 | WEIGHT: 163.4 LBS | HEART RATE: 73 BPM | DIASTOLIC BLOOD PRESSURE: 95 MMHG | OXYGEN SATURATION: 97 % | SYSTOLIC BLOOD PRESSURE: 135 MMHG | TEMPERATURE: 96.2 F

## 2020-12-07 DIAGNOSIS — E83.110 HEREDITARY HEMOCHROMATOSIS (HCC): ICD-10-CM

## 2020-12-07 DIAGNOSIS — D69.6 THROMBOCYTOPENIA (HCC): ICD-10-CM

## 2020-12-07 LAB
BASOPHILS # BLD AUTO: 0.08 10*3/MM3 (ref 0–0.2)
BASOPHILS NFR BLD AUTO: 1.1 % (ref 0–1.5)
DEPRECATED RDW RBC AUTO: 44.8 FL (ref 37–54)
EOSINOPHIL # BLD AUTO: 0.32 10*3/MM3 (ref 0–0.4)
EOSINOPHIL NFR BLD AUTO: 4.3 % (ref 0.3–6.2)
ERYTHROCYTE [DISTWIDTH] IN BLOOD BY AUTOMATED COUNT: 12.5 % (ref 12.3–15.4)
FERRITIN SERPL-MCNC: 334.3 NG/ML (ref 30–400)
HCT VFR BLD AUTO: 49.5 % (ref 37.5–51)
HGB BLD-MCNC: 17 G/DL (ref 13–17.7)
HGB RETIC QN AUTO: 40.1 PG (ref 29.8–36.1)
IMM GRANULOCYTES # BLD AUTO: 0.02 10*3/MM3 (ref 0–0.05)
IMM GRANULOCYTES NFR BLD AUTO: 0.3 % (ref 0–0.5)
IMM RETICS NFR: 4.3 % (ref 3–15.8)
IRON 24H UR-MRATE: 197 MCG/DL (ref 59–158)
IRON SATN MFR SERPL: 69 % (ref 14–48)
LYMPHOCYTES # BLD AUTO: 1.79 10*3/MM3 (ref 0.7–3.1)
LYMPHOCYTES NFR BLD AUTO: 23.9 % (ref 19.6–45.3)
MCH RBC QN AUTO: 33.4 PG (ref 26.6–33)
MCHC RBC AUTO-ENTMCNC: 34.3 G/DL (ref 31.5–35.7)
MCV RBC AUTO: 97.2 FL (ref 79–97)
MONOCYTES # BLD AUTO: 0.68 10*3/MM3 (ref 0.1–0.9)
MONOCYTES NFR BLD AUTO: 9.1 % (ref 5–12)
NEUTROPHILS NFR BLD AUTO: 4.59 10*3/MM3 (ref 1.7–7)
NEUTROPHILS NFR BLD AUTO: 61.3 % (ref 42.7–76)
NRBC BLD AUTO-RTO: 0 /100 WBC (ref 0–0.2)
PLATELET # BLD AUTO: 122 10*3/MM3 (ref 140–450)
PMV BLD AUTO: 11.3 FL (ref 6–12)
RBC # BLD AUTO: 5.09 10*6/MM3 (ref 4.14–5.8)
RETICS # AUTO: 0.06 10*6/MM3 (ref 0.02–0.13)
RETICS/RBC NFR AUTO: 1.19 % (ref 0.7–1.9)
TIBC SERPL-MCNC: 284 MCG/DL (ref 249–505)
TRANSFERRIN SERPL-MCNC: 203 MG/DL (ref 200–360)
WBC # BLD AUTO: 7.48 10*3/MM3 (ref 3.4–10.8)

## 2020-12-07 PROCEDURE — 83540 ASSAY OF IRON: CPT

## 2020-12-07 PROCEDURE — 85025 COMPLETE CBC W/AUTO DIFF WBC: CPT

## 2020-12-07 PROCEDURE — 85046 RETICYTE/HGB CONCENTRATE: CPT

## 2020-12-07 PROCEDURE — 82728 ASSAY OF FERRITIN: CPT

## 2020-12-07 PROCEDURE — 36415 COLL VENOUS BLD VENIPUNCTURE: CPT

## 2020-12-07 PROCEDURE — 84466 ASSAY OF TRANSFERRIN: CPT

## 2020-12-07 PROCEDURE — 99195 PHLEBOTOMY: CPT

## 2020-12-21 ENCOUNTER — LAB (OUTPATIENT)
Dept: LAB | Facility: HOSPITAL | Age: 65
End: 2020-12-21

## 2020-12-21 ENCOUNTER — TELEPHONE (OUTPATIENT)
Dept: ONCOLOGY | Facility: CLINIC | Age: 65
End: 2020-12-21

## 2020-12-21 ENCOUNTER — INFUSION (OUTPATIENT)
Dept: ONCOLOGY | Facility: HOSPITAL | Age: 65
End: 2020-12-21

## 2020-12-21 DIAGNOSIS — E83.110 HEREDITARY HEMOCHROMATOSIS (HCC): ICD-10-CM

## 2020-12-21 PROCEDURE — 36415 COLL VENOUS BLD VENIPUNCTURE: CPT

## 2020-12-21 NOTE — TELEPHONE ENCOUNTER
Called and lvm about pt appt on 1/4/20, advised if pt had any questions to call us- mailed appts as well

## 2020-12-21 NOTE — NURSING NOTE
Pt with complaint of cold symptoms.  Pt states he had rapid testing this morning.  Pt spoke to RN manager, and since pt has been symptomatic, he was asked to come back after 12/29.  Message sent to scheduling to reschedule anytime after 12/29.

## 2020-12-28 ENCOUNTER — TRANSCRIBE ORDERS (OUTPATIENT)
Dept: ADMINISTRATIVE | Facility: HOSPITAL | Age: 65
End: 2020-12-28

## 2020-12-28 DIAGNOSIS — R91.1 PULMONARY NODULE, LEFT: Primary | ICD-10-CM

## 2021-01-04 ENCOUNTER — APPOINTMENT (OUTPATIENT)
Dept: ONCOLOGY | Facility: HOSPITAL | Age: 66
End: 2021-01-04

## 2021-01-04 ENCOUNTER — APPOINTMENT (OUTPATIENT)
Dept: LAB | Facility: HOSPITAL | Age: 66
End: 2021-01-04

## 2021-01-07 ENCOUNTER — INFUSION (OUTPATIENT)
Dept: ONCOLOGY | Facility: HOSPITAL | Age: 66
End: 2021-01-07

## 2021-01-07 ENCOUNTER — LAB (OUTPATIENT)
Dept: LAB | Facility: HOSPITAL | Age: 66
End: 2021-01-07

## 2021-01-07 VITALS
SYSTOLIC BLOOD PRESSURE: 130 MMHG | HEART RATE: 73 BPM | DIASTOLIC BLOOD PRESSURE: 87 MMHG | RESPIRATION RATE: 16 BRPM | WEIGHT: 161.2 LBS | TEMPERATURE: 96.9 F | BODY MASS INDEX: 20.25 KG/M2 | OXYGEN SATURATION: 97 %

## 2021-01-07 DIAGNOSIS — E83.110 HEREDITARY HEMOCHROMATOSIS (HCC): ICD-10-CM

## 2021-01-07 DIAGNOSIS — R79.89 ELEVATED FERRITIN: ICD-10-CM

## 2021-01-07 LAB
BASOPHILS # BLD AUTO: 0.07 10*3/MM3 (ref 0–0.2)
BASOPHILS NFR BLD AUTO: 1.1 % (ref 0–1.5)
DEPRECATED RDW RBC AUTO: 45.8 FL (ref 37–54)
EOSINOPHIL # BLD AUTO: 0.28 10*3/MM3 (ref 0–0.4)
EOSINOPHIL NFR BLD AUTO: 4.4 % (ref 0.3–6.2)
ERYTHROCYTE [DISTWIDTH] IN BLOOD BY AUTOMATED COUNT: 12.1 % (ref 12.3–15.4)
FERRITIN SERPL-MCNC: 274.9 NG/ML (ref 30–400)
HCT VFR BLD AUTO: 48.8 % (ref 37.5–51)
HGB BLD-MCNC: 16.3 G/DL (ref 13–17.7)
HGB RETIC QN AUTO: 38.9 PG (ref 29.8–36.1)
IMM GRANULOCYTES # BLD AUTO: 0.01 10*3/MM3 (ref 0–0.05)
IMM GRANULOCYTES NFR BLD AUTO: 0.2 % (ref 0–0.5)
IMM RETICS NFR: 2.8 % (ref 3–15.8)
IRON 24H UR-MRATE: 135 MCG/DL (ref 59–158)
IRON SATN MFR SERPL: 48 % (ref 14–48)
LYMPHOCYTES # BLD AUTO: 1.4 10*3/MM3 (ref 0.7–3.1)
LYMPHOCYTES NFR BLD AUTO: 22 % (ref 19.6–45.3)
MCH RBC QN AUTO: 33.5 PG (ref 26.6–33)
MCHC RBC AUTO-ENTMCNC: 33.4 G/DL (ref 31.5–35.7)
MCV RBC AUTO: 100.2 FL (ref 79–97)
MONOCYTES # BLD AUTO: 0.52 10*3/MM3 (ref 0.1–0.9)
MONOCYTES NFR BLD AUTO: 8.2 % (ref 5–12)
NEUTROPHILS NFR BLD AUTO: 4.07 10*3/MM3 (ref 1.7–7)
NEUTROPHILS NFR BLD AUTO: 64.1 % (ref 42.7–76)
NRBC BLD AUTO-RTO: 0 /100 WBC (ref 0–0.2)
PLATELET # BLD AUTO: 146 10*3/MM3 (ref 140–450)
PMV BLD AUTO: 10.6 FL (ref 6–12)
RBC # BLD AUTO: 4.87 10*6/MM3 (ref 4.14–5.8)
RETICS # AUTO: 0.05 10*6/MM3 (ref 0.02–0.13)
RETICS/RBC NFR AUTO: 0.93 % (ref 0.7–1.9)
TIBC SERPL-MCNC: 281 MCG/DL (ref 249–505)
TRANSFERRIN SERPL-MCNC: 201 MG/DL (ref 200–360)
WBC # BLD AUTO: 6.35 10*3/MM3 (ref 3.4–10.8)

## 2021-01-07 PROCEDURE — 84466 ASSAY OF TRANSFERRIN: CPT

## 2021-01-07 PROCEDURE — 36415 COLL VENOUS BLD VENIPUNCTURE: CPT

## 2021-01-07 PROCEDURE — 83540 ASSAY OF IRON: CPT

## 2021-01-07 PROCEDURE — 99195 PHLEBOTOMY: CPT

## 2021-01-07 PROCEDURE — 85025 COMPLETE CBC W/AUTO DIFF WBC: CPT

## 2021-01-07 PROCEDURE — 85046 RETICYTE/HGB CONCENTRATE: CPT

## 2021-01-07 PROCEDURE — 82728 ASSAY OF FERRITIN: CPT

## 2021-01-18 ENCOUNTER — APPOINTMENT (OUTPATIENT)
Dept: ONCOLOGY | Facility: HOSPITAL | Age: 66
End: 2021-01-18

## 2021-01-18 ENCOUNTER — APPOINTMENT (OUTPATIENT)
Dept: LAB | Facility: HOSPITAL | Age: 66
End: 2021-01-18

## 2021-02-01 ENCOUNTER — OFFICE VISIT (OUTPATIENT)
Dept: ONCOLOGY | Facility: CLINIC | Age: 66
End: 2021-02-01

## 2021-02-01 ENCOUNTER — LAB (OUTPATIENT)
Dept: LAB | Facility: HOSPITAL | Age: 66
End: 2021-02-01

## 2021-02-01 ENCOUNTER — INFUSION (OUTPATIENT)
Dept: ONCOLOGY | Facility: HOSPITAL | Age: 66
End: 2021-02-01

## 2021-02-01 VITALS
SYSTOLIC BLOOD PRESSURE: 142 MMHG | RESPIRATION RATE: 16 BRPM | WEIGHT: 160.6 LBS | HEIGHT: 75 IN | HEART RATE: 71 BPM | DIASTOLIC BLOOD PRESSURE: 91 MMHG | OXYGEN SATURATION: 96 % | BODY MASS INDEX: 19.97 KG/M2 | TEMPERATURE: 97.1 F

## 2021-02-01 DIAGNOSIS — E83.110 HEREDITARY HEMOCHROMATOSIS (HCC): ICD-10-CM

## 2021-02-01 DIAGNOSIS — E83.110 HEREDITARY HEMOCHROMATOSIS (HCC): Primary | ICD-10-CM

## 2021-02-01 LAB
BASOPHILS # BLD AUTO: 0.07 10*3/MM3 (ref 0–0.2)
BASOPHILS NFR BLD AUTO: 1.4 % (ref 0–1.5)
DEPRECATED RDW RBC AUTO: 45.9 FL (ref 37–54)
EOSINOPHIL # BLD AUTO: 0.23 10*3/MM3 (ref 0–0.4)
EOSINOPHIL NFR BLD AUTO: 4.5 % (ref 0.3–6.2)
ERYTHROCYTE [DISTWIDTH] IN BLOOD BY AUTOMATED COUNT: 12.3 % (ref 12.3–15.4)
FERRITIN SERPL-MCNC: 226.4 NG/ML (ref 30–400)
HCT VFR BLD AUTO: 51.8 % (ref 37.5–51)
HGB BLD-MCNC: 16.9 G/DL (ref 13–17.7)
HGB RETIC QN AUTO: 38.5 PG (ref 29.8–36.1)
IMM GRANULOCYTES # BLD AUTO: 0.01 10*3/MM3 (ref 0–0.05)
IMM GRANULOCYTES NFR BLD AUTO: 0.2 % (ref 0–0.5)
IMM RETICS NFR: 6.6 % (ref 3–15.8)
IRON 24H UR-MRATE: 139 MCG/DL (ref 59–158)
IRON SATN MFR SERPL: 46 % (ref 14–48)
LYMPHOCYTES # BLD AUTO: 1.31 10*3/MM3 (ref 0.7–3.1)
LYMPHOCYTES NFR BLD AUTO: 25.6 % (ref 19.6–45.3)
MCH RBC QN AUTO: 32.5 PG (ref 26.6–33)
MCHC RBC AUTO-ENTMCNC: 32.6 G/DL (ref 31.5–35.7)
MCV RBC AUTO: 99.6 FL (ref 79–97)
MONOCYTES # BLD AUTO: 0.4 10*3/MM3 (ref 0.1–0.9)
MONOCYTES NFR BLD AUTO: 7.8 % (ref 5–12)
NEUTROPHILS NFR BLD AUTO: 3.09 10*3/MM3 (ref 1.7–7)
NEUTROPHILS NFR BLD AUTO: 60.5 % (ref 42.7–76)
NRBC BLD AUTO-RTO: 0 /100 WBC (ref 0–0.2)
PLATELET # BLD AUTO: 140 10*3/MM3 (ref 140–450)
PMV BLD AUTO: 10.2 FL (ref 6–12)
RBC # BLD AUTO: 5.2 10*6/MM3 (ref 4.14–5.8)
RETICS # AUTO: 0.05 10*6/MM3 (ref 0.02–0.13)
RETICS/RBC NFR AUTO: 0.95 % (ref 0.7–1.9)
TIBC SERPL-MCNC: 300 MCG/DL (ref 249–505)
TRANSFERRIN SERPL-MCNC: 214 MG/DL (ref 200–360)
WBC # BLD AUTO: 5.11 10*3/MM3 (ref 3.4–10.8)

## 2021-02-01 PROCEDURE — 36415 COLL VENOUS BLD VENIPUNCTURE: CPT

## 2021-02-01 PROCEDURE — 82728 ASSAY OF FERRITIN: CPT

## 2021-02-01 PROCEDURE — 83540 ASSAY OF IRON: CPT

## 2021-02-01 PROCEDURE — 85046 RETICYTE/HGB CONCENTRATE: CPT

## 2021-02-01 PROCEDURE — 99195 PHLEBOTOMY: CPT

## 2021-02-01 PROCEDURE — 99214 OFFICE O/P EST MOD 30 MIN: CPT | Performed by: INTERNAL MEDICINE

## 2021-02-01 PROCEDURE — 85025 COMPLETE CBC W/AUTO DIFF WBC: CPT

## 2021-02-01 PROCEDURE — 84466 ASSAY OF TRANSFERRIN: CPT

## 2021-02-01 RX ORDER — SODIUM CHLORIDE 9 MG/ML
250 INJECTION, SOLUTION INTRAVENOUS ONCE
Status: DISCONTINUED | OUTPATIENT
Start: 2021-02-01 | End: 2021-02-01 | Stop reason: HOSPADM

## 2021-02-01 RX ORDER — SODIUM CHLORIDE 9 MG/ML
250 INJECTION, SOLUTION INTRAVENOUS ONCE
Status: CANCELLED | OUTPATIENT
Start: 2021-02-01

## 2021-02-15 ENCOUNTER — APPOINTMENT (OUTPATIENT)
Dept: LAB | Facility: HOSPITAL | Age: 66
End: 2021-02-15

## 2021-02-15 ENCOUNTER — APPOINTMENT (OUTPATIENT)
Dept: ONCOLOGY | Facility: HOSPITAL | Age: 66
End: 2021-02-15

## 2021-02-18 ENCOUNTER — LAB (OUTPATIENT)
Dept: LAB | Facility: HOSPITAL | Age: 66
End: 2021-02-18

## 2021-02-18 ENCOUNTER — INFUSION (OUTPATIENT)
Dept: ONCOLOGY | Facility: HOSPITAL | Age: 66
End: 2021-02-18

## 2021-02-18 VITALS
HEART RATE: 66 BPM | WEIGHT: 164.2 LBS | DIASTOLIC BLOOD PRESSURE: 89 MMHG | BODY MASS INDEX: 20.63 KG/M2 | TEMPERATURE: 97 F | OXYGEN SATURATION: 97 % | SYSTOLIC BLOOD PRESSURE: 140 MMHG | RESPIRATION RATE: 16 BRPM

## 2021-02-18 DIAGNOSIS — E83.110 HEREDITARY HEMOCHROMATOSIS (HCC): ICD-10-CM

## 2021-02-18 DIAGNOSIS — E83.110 HEREDITARY HEMOCHROMATOSIS (HCC): Primary | ICD-10-CM

## 2021-02-18 LAB
ALPHA-FETOPROTEIN: 2.17 NG/ML (ref 0–8.3)
BASOPHILS # BLD AUTO: 0.09 10*3/MM3 (ref 0–0.2)
BASOPHILS NFR BLD AUTO: 1.7 % (ref 0–1.5)
DEPRECATED RDW RBC AUTO: 46.4 FL (ref 37–54)
EOSINOPHIL # BLD AUTO: 0.41 10*3/MM3 (ref 0–0.4)
EOSINOPHIL NFR BLD AUTO: 7.8 % (ref 0.3–6.2)
ERYTHROCYTE [DISTWIDTH] IN BLOOD BY AUTOMATED COUNT: 12.5 % (ref 12.3–15.4)
FERRITIN SERPL-MCNC: 198.4 NG/ML (ref 30–400)
HCT VFR BLD AUTO: 50.6 % (ref 37.5–51)
HGB BLD-MCNC: 16.7 G/DL (ref 13–17.7)
HGB RETIC QN AUTO: 37.5 PG (ref 29.8–36.1)
IMM GRANULOCYTES # BLD AUTO: 0.01 10*3/MM3 (ref 0–0.05)
IMM GRANULOCYTES NFR BLD AUTO: 0.2 % (ref 0–0.5)
IMM RETICS NFR: 4.9 % (ref 3–15.8)
IRON 24H UR-MRATE: 136 MCG/DL (ref 59–158)
IRON SATN MFR SERPL: 45 % (ref 14–48)
LYMPHOCYTES # BLD AUTO: 1.4 10*3/MM3 (ref 0.7–3.1)
LYMPHOCYTES NFR BLD AUTO: 26.5 % (ref 19.6–45.3)
MCH RBC QN AUTO: 32.9 PG (ref 26.6–33)
MCHC RBC AUTO-ENTMCNC: 33 G/DL (ref 31.5–35.7)
MCV RBC AUTO: 99.8 FL (ref 79–97)
MONOCYTES # BLD AUTO: 0.42 10*3/MM3 (ref 0.1–0.9)
MONOCYTES NFR BLD AUTO: 8 % (ref 5–12)
NEUTROPHILS NFR BLD AUTO: 2.95 10*3/MM3 (ref 1.7–7)
NEUTROPHILS NFR BLD AUTO: 55.8 % (ref 42.7–76)
NRBC BLD AUTO-RTO: 0 /100 WBC (ref 0–0.2)
PLATELET # BLD AUTO: 136 10*3/MM3 (ref 140–450)
PMV BLD AUTO: 10.8 FL (ref 6–12)
RBC # BLD AUTO: 5.07 10*6/MM3 (ref 4.14–5.8)
RETICS # AUTO: 0.06 10*6/MM3 (ref 0.02–0.13)
RETICS/RBC NFR AUTO: 1.1 % (ref 0.7–1.9)
TIBC SERPL-MCNC: 300 MCG/DL (ref 249–505)
TRANSFERRIN SERPL-MCNC: 214 MG/DL (ref 200–360)
WBC # BLD AUTO: 5.28 10*3/MM3 (ref 3.4–10.8)

## 2021-02-18 PROCEDURE — 84466 ASSAY OF TRANSFERRIN: CPT

## 2021-02-18 PROCEDURE — 36415 COLL VENOUS BLD VENIPUNCTURE: CPT

## 2021-02-18 PROCEDURE — 85046 RETICYTE/HGB CONCENTRATE: CPT

## 2021-02-18 PROCEDURE — 85025 COMPLETE CBC W/AUTO DIFF WBC: CPT

## 2021-02-18 PROCEDURE — 99195 PHLEBOTOMY: CPT

## 2021-02-18 PROCEDURE — 82105 ALPHA-FETOPROTEIN SERUM: CPT | Performed by: INTERNAL MEDICINE

## 2021-02-18 PROCEDURE — 83540 ASSAY OF IRON: CPT

## 2021-02-18 PROCEDURE — 82728 ASSAY OF FERRITIN: CPT

## 2021-02-18 RX ORDER — SODIUM CHLORIDE 9 MG/ML
250 INJECTION, SOLUTION INTRAVENOUS ONCE
Status: CANCELLED | OUTPATIENT
Start: 2021-02-18

## 2021-02-26 ENCOUNTER — TELEPHONE (OUTPATIENT)
Dept: ONCOLOGY | Facility: CLINIC | Age: 66
End: 2021-02-26

## 2021-02-26 NOTE — TELEPHONE ENCOUNTER
Caller: PATIENT    Relationship to patient:     Best call back number: [phone]    Chief complaint: PATIENT RECEIVED HIS FIRST COVID VACCINE TODAY AND WANTS TO MAKE SURE HE SHOULD STILL COME IN ON MON FOR HIS LAB AND INFUSION, PLEASE ADVISE?    Type of visit: LAB & INFUSION    Requested date: NA    If rescheduling, when is the original appointment: 3-1-21    Additional notes:    Pt ok to come in on Monday for phlebo

## 2021-02-26 NOTE — TELEPHONE ENCOUNTER
Caller: PATIENT    Relationship to patient:     Best call back number: [phone]    Chief complaint: PATIENT RECEIVED HIS FIRST COVID VACCINE TODAY AND WANTS TO MAKE SURE HE SHOULD STILL COME IN ON MON FOR HIS LAB AND INFUSION, PLEASE ADVISE?    Type of visit: LAB & INFUSION    Requested date: NA    If rescheduling, when is the original appointment: 3-1-21    Additional notes:

## 2021-03-01 ENCOUNTER — APPOINTMENT (OUTPATIENT)
Dept: LAB | Facility: HOSPITAL | Age: 66
End: 2021-03-01

## 2021-03-01 ENCOUNTER — APPOINTMENT (OUTPATIENT)
Dept: ONCOLOGY | Facility: HOSPITAL | Age: 66
End: 2021-03-01

## 2021-03-05 ENCOUNTER — INFUSION (OUTPATIENT)
Dept: ONCOLOGY | Facility: HOSPITAL | Age: 66
End: 2021-03-05

## 2021-03-05 ENCOUNTER — LAB (OUTPATIENT)
Dept: LAB | Facility: HOSPITAL | Age: 66
End: 2021-03-05

## 2021-03-05 VITALS
WEIGHT: 161.8 LBS | OXYGEN SATURATION: 98 % | BODY MASS INDEX: 20.33 KG/M2 | SYSTOLIC BLOOD PRESSURE: 131 MMHG | TEMPERATURE: 97.1 F | DIASTOLIC BLOOD PRESSURE: 79 MMHG | HEART RATE: 65 BPM | RESPIRATION RATE: 16 BRPM

## 2021-03-05 DIAGNOSIS — E83.110 HEREDITARY HEMOCHROMATOSIS (HCC): ICD-10-CM

## 2021-03-05 LAB
BASOPHILS # BLD AUTO: 0.07 10*3/MM3 (ref 0–0.2)
BASOPHILS NFR BLD AUTO: 1.3 % (ref 0–1.5)
DEPRECATED RDW RBC AUTO: 47.1 FL (ref 37–54)
EOSINOPHIL # BLD AUTO: 0.3 10*3/MM3 (ref 0–0.4)
EOSINOPHIL NFR BLD AUTO: 5.7 % (ref 0.3–6.2)
ERYTHROCYTE [DISTWIDTH] IN BLOOD BY AUTOMATED COUNT: 12.6 % (ref 12.3–15.4)
FERRITIN SERPL-MCNC: 157.7 NG/ML (ref 30–400)
HCT VFR BLD AUTO: 46.1 % (ref 37.5–51)
HGB BLD-MCNC: 15.2 G/DL (ref 13–17.7)
HGB RETIC QN AUTO: 37.1 PG (ref 29.8–36.1)
IMM GRANULOCYTES # BLD AUTO: 0.01 10*3/MM3 (ref 0–0.05)
IMM GRANULOCYTES NFR BLD AUTO: 0.2 % (ref 0–0.5)
IMM RETICS NFR: 6.2 % (ref 3–15.8)
IRON 24H UR-MRATE: 139 MCG/DL (ref 59–158)
IRON SATN MFR SERPL: 54 % (ref 14–48)
LYMPHOCYTES # BLD AUTO: 1.59 10*3/MM3 (ref 0.7–3.1)
LYMPHOCYTES NFR BLD AUTO: 30.1 % (ref 19.6–45.3)
MCH RBC QN AUTO: 33 PG (ref 26.6–33)
MCHC RBC AUTO-ENTMCNC: 33 G/DL (ref 31.5–35.7)
MCV RBC AUTO: 100.2 FL (ref 79–97)
MONOCYTES # BLD AUTO: 0.5 10*3/MM3 (ref 0.1–0.9)
MONOCYTES NFR BLD AUTO: 9.5 % (ref 5–12)
NEUTROPHILS NFR BLD AUTO: 2.82 10*3/MM3 (ref 1.7–7)
NEUTROPHILS NFR BLD AUTO: 53.2 % (ref 42.7–76)
NRBC BLD AUTO-RTO: 0 /100 WBC (ref 0–0.2)
PLATELET # BLD AUTO: 153 10*3/MM3 (ref 140–450)
PMV BLD AUTO: 9.9 FL (ref 6–12)
RBC # BLD AUTO: 4.6 10*6/MM3 (ref 4.14–5.8)
RETICS # AUTO: 0.05 10*6/MM3 (ref 0.02–0.13)
RETICS/RBC NFR AUTO: 1.18 % (ref 0.7–1.9)
TIBC SERPL-MCNC: 258 MCG/DL (ref 249–505)
TRANSFERRIN SERPL-MCNC: 184 MG/DL (ref 200–360)
WBC # BLD AUTO: 5.29 10*3/MM3 (ref 3.4–10.8)

## 2021-03-05 PROCEDURE — 82728 ASSAY OF FERRITIN: CPT

## 2021-03-05 PROCEDURE — 85025 COMPLETE CBC W/AUTO DIFF WBC: CPT

## 2021-03-05 PROCEDURE — 83540 ASSAY OF IRON: CPT

## 2021-03-05 PROCEDURE — 84466 ASSAY OF TRANSFERRIN: CPT

## 2021-03-05 PROCEDURE — 99195 PHLEBOTOMY: CPT

## 2021-03-05 PROCEDURE — 36415 COLL VENOUS BLD VENIPUNCTURE: CPT

## 2021-03-05 PROCEDURE — 85046 RETICYTE/HGB CONCENTRATE: CPT

## 2021-03-10 ENCOUNTER — TELEPHONE (OUTPATIENT)
Dept: ONCOLOGY | Facility: CLINIC | Age: 66
End: 2021-03-10

## 2021-03-10 NOTE — TELEPHONE ENCOUNTER
Caller: PT    Relationship to patient: PT    Best call back number: [phone]  TXT MSG NOT OK    PT TO CANCEL 3/15 APPT   THANK YOU

## 2021-03-15 ENCOUNTER — APPOINTMENT (OUTPATIENT)
Dept: LAB | Facility: HOSPITAL | Age: 66
End: 2021-03-15

## 2021-03-15 ENCOUNTER — APPOINTMENT (OUTPATIENT)
Dept: ONCOLOGY | Facility: HOSPITAL | Age: 66
End: 2021-03-15

## 2021-03-29 ENCOUNTER — LAB (OUTPATIENT)
Dept: LAB | Facility: HOSPITAL | Age: 66
End: 2021-03-29

## 2021-03-29 ENCOUNTER — INFUSION (OUTPATIENT)
Dept: ONCOLOGY | Facility: HOSPITAL | Age: 66
End: 2021-03-29

## 2021-03-29 VITALS
DIASTOLIC BLOOD PRESSURE: 80 MMHG | TEMPERATURE: 97.3 F | RESPIRATION RATE: 16 BRPM | HEART RATE: 85 BPM | BODY MASS INDEX: 20.51 KG/M2 | SYSTOLIC BLOOD PRESSURE: 118 MMHG | OXYGEN SATURATION: 95 % | WEIGHT: 163.2 LBS

## 2021-03-29 DIAGNOSIS — E83.110 HEREDITARY HEMOCHROMATOSIS (HCC): ICD-10-CM

## 2021-03-29 DIAGNOSIS — R79.89 ELEVATED FERRITIN: ICD-10-CM

## 2021-03-29 LAB
BASOPHILS # BLD AUTO: 0.06 10*3/MM3 (ref 0–0.2)
BASOPHILS NFR BLD AUTO: 1.1 % (ref 0–1.5)
DEPRECATED RDW RBC AUTO: 47.6 FL (ref 37–54)
EOSINOPHIL # BLD AUTO: 0.23 10*3/MM3 (ref 0–0.4)
EOSINOPHIL NFR BLD AUTO: 4.3 % (ref 0.3–6.2)
ERYTHROCYTE [DISTWIDTH] IN BLOOD BY AUTOMATED COUNT: 12.9 % (ref 12.3–15.4)
FERRITIN SERPL-MCNC: 145.7 NG/ML (ref 30–400)
HCT VFR BLD AUTO: 47.6 % (ref 37.5–51)
HGB BLD-MCNC: 15.8 G/DL (ref 13–17.7)
HGB RETIC QN AUTO: 38.3 PG (ref 29.8–36.1)
IMM GRANULOCYTES # BLD AUTO: 0.01 10*3/MM3 (ref 0–0.05)
IMM GRANULOCYTES NFR BLD AUTO: 0.2 % (ref 0–0.5)
IMM RETICS NFR: 5.8 % (ref 3–15.8)
IRON 24H UR-MRATE: 132 MCG/DL (ref 59–158)
IRON SATN MFR SERPL: 46 % (ref 14–48)
LYMPHOCYTES # BLD AUTO: 1.31 10*3/MM3 (ref 0.7–3.1)
LYMPHOCYTES NFR BLD AUTO: 24.2 % (ref 19.6–45.3)
MCH RBC QN AUTO: 33.1 PG (ref 26.6–33)
MCHC RBC AUTO-ENTMCNC: 33.2 G/DL (ref 31.5–35.7)
MCV RBC AUTO: 99.6 FL (ref 79–97)
MONOCYTES # BLD AUTO: 0.49 10*3/MM3 (ref 0.1–0.9)
MONOCYTES NFR BLD AUTO: 9.1 % (ref 5–12)
NEUTROPHILS NFR BLD AUTO: 3.31 10*3/MM3 (ref 1.7–7)
NEUTROPHILS NFR BLD AUTO: 61.1 % (ref 42.7–76)
NRBC BLD AUTO-RTO: 0 /100 WBC (ref 0–0.2)
PLATELET # BLD AUTO: 131 10*3/MM3 (ref 140–450)
PMV BLD AUTO: 10.6 FL (ref 6–12)
RBC # BLD AUTO: 4.78 10*6/MM3 (ref 4.14–5.8)
RETICS # AUTO: 0.06 10*6/MM3 (ref 0.02–0.13)
RETICS/RBC NFR AUTO: 1.26 % (ref 0.7–1.9)
TIBC SERPL-MCNC: 290 MCG/DL (ref 249–505)
TRANSFERRIN SERPL-MCNC: 207 MG/DL (ref 200–360)
WBC # BLD AUTO: 5.41 10*3/MM3 (ref 3.4–10.8)

## 2021-03-29 PROCEDURE — 99195 PHLEBOTOMY: CPT

## 2021-03-29 PROCEDURE — 83540 ASSAY OF IRON: CPT

## 2021-03-29 PROCEDURE — 85046 RETICYTE/HGB CONCENTRATE: CPT

## 2021-03-29 PROCEDURE — 82728 ASSAY OF FERRITIN: CPT

## 2021-03-29 PROCEDURE — 36415 COLL VENOUS BLD VENIPUNCTURE: CPT

## 2021-03-29 PROCEDURE — 84466 ASSAY OF TRANSFERRIN: CPT

## 2021-03-29 PROCEDURE — 85025 COMPLETE CBC W/AUTO DIFF WBC: CPT

## 2021-04-12 ENCOUNTER — APPOINTMENT (OUTPATIENT)
Dept: LAB | Facility: HOSPITAL | Age: 66
End: 2021-04-12

## 2021-04-12 ENCOUNTER — APPOINTMENT (OUTPATIENT)
Dept: ONCOLOGY | Facility: HOSPITAL | Age: 66
End: 2021-04-12

## 2021-04-14 ENCOUNTER — INFUSION (OUTPATIENT)
Dept: ONCOLOGY | Facility: HOSPITAL | Age: 66
End: 2021-04-14

## 2021-04-14 ENCOUNTER — LAB (OUTPATIENT)
Dept: LAB | Facility: HOSPITAL | Age: 66
End: 2021-04-14

## 2021-04-14 VITALS
SYSTOLIC BLOOD PRESSURE: 105 MMHG | OXYGEN SATURATION: 97 % | BODY MASS INDEX: 20.41 KG/M2 | RESPIRATION RATE: 16 BRPM | TEMPERATURE: 97.3 F | DIASTOLIC BLOOD PRESSURE: 71 MMHG | WEIGHT: 162.4 LBS | HEART RATE: 80 BPM

## 2021-04-14 DIAGNOSIS — E83.110 HEREDITARY HEMOCHROMATOSIS (HCC): Primary | ICD-10-CM

## 2021-04-14 DIAGNOSIS — E83.110 HEREDITARY HEMOCHROMATOSIS (HCC): ICD-10-CM

## 2021-04-14 LAB
BASOPHILS # BLD AUTO: 0.09 10*3/MM3 (ref 0–0.2)
BASOPHILS NFR BLD AUTO: 1.6 % (ref 0–1.5)
DEPRECATED RDW RBC AUTO: 48.1 FL (ref 37–54)
EOSINOPHIL # BLD AUTO: 0.26 10*3/MM3 (ref 0–0.4)
EOSINOPHIL NFR BLD AUTO: 4.7 % (ref 0.3–6.2)
ERYTHROCYTE [DISTWIDTH] IN BLOOD BY AUTOMATED COUNT: 12.8 % (ref 12.3–15.4)
FERRITIN SERPL-MCNC: 113.6 NG/ML (ref 30–400)
HCT VFR BLD AUTO: 47.5 % (ref 37.5–51)
HGB BLD-MCNC: 15.5 G/DL (ref 13–17.7)
HGB RETIC QN AUTO: 37.1 PG (ref 29.8–36.1)
IMM GRANULOCYTES # BLD AUTO: 0.01 10*3/MM3 (ref 0–0.05)
IMM GRANULOCYTES NFR BLD AUTO: 0.2 % (ref 0–0.5)
IMM RETICS NFR: 7.5 % (ref 3–15.8)
IRON 24H UR-MRATE: 185 MCG/DL (ref 59–158)
IRON SATN MFR SERPL: 62 % (ref 14–48)
LYMPHOCYTES # BLD AUTO: 1.46 10*3/MM3 (ref 0.7–3.1)
LYMPHOCYTES NFR BLD AUTO: 26.5 % (ref 19.6–45.3)
MCH RBC QN AUTO: 33 PG (ref 26.6–33)
MCHC RBC AUTO-ENTMCNC: 32.6 G/DL (ref 31.5–35.7)
MCV RBC AUTO: 101.3 FL (ref 79–97)
MONOCYTES # BLD AUTO: 0.51 10*3/MM3 (ref 0.1–0.9)
MONOCYTES NFR BLD AUTO: 9.3 % (ref 5–12)
NEUTROPHILS NFR BLD AUTO: 3.17 10*3/MM3 (ref 1.7–7)
NEUTROPHILS NFR BLD AUTO: 57.7 % (ref 42.7–76)
NRBC BLD AUTO-RTO: 0 /100 WBC (ref 0–0.2)
PLATELET # BLD AUTO: 147 10*3/MM3 (ref 140–450)
PMV BLD AUTO: 10.5 FL (ref 6–12)
RBC # BLD AUTO: 4.69 10*6/MM3 (ref 4.14–5.8)
RETICS # AUTO: 0.05 10*6/MM3 (ref 0.02–0.13)
RETICS/RBC NFR AUTO: 1.17 % (ref 0.7–1.9)
TIBC SERPL-MCNC: 300 MCG/DL (ref 249–505)
TRANSFERRIN SERPL-MCNC: 214 MG/DL (ref 200–360)
WBC # BLD AUTO: 5.5 10*3/MM3 (ref 3.4–10.8)

## 2021-04-14 PROCEDURE — 83540 ASSAY OF IRON: CPT

## 2021-04-14 PROCEDURE — 82728 ASSAY OF FERRITIN: CPT

## 2021-04-14 PROCEDURE — 85046 RETICYTE/HGB CONCENTRATE: CPT

## 2021-04-14 PROCEDURE — 84466 ASSAY OF TRANSFERRIN: CPT

## 2021-04-14 PROCEDURE — 99195 PHLEBOTOMY: CPT

## 2021-04-14 PROCEDURE — 85025 COMPLETE CBC W/AUTO DIFF WBC: CPT

## 2021-04-14 PROCEDURE — 36415 COLL VENOUS BLD VENIPUNCTURE: CPT

## 2021-04-14 RX ORDER — SODIUM CHLORIDE 9 MG/ML
250 INJECTION, SOLUTION INTRAVENOUS ONCE
Status: CANCELLED | OUTPATIENT
Start: 2021-04-14

## 2021-04-14 RX ORDER — SODIUM CHLORIDE 9 MG/ML
250 INJECTION, SOLUTION INTRAVENOUS ONCE
Status: DISCONTINUED | OUTPATIENT
Start: 2021-04-14 | End: 2021-04-14 | Stop reason: HOSPADM

## 2021-04-25 NOTE — PROGRESS NOTES
"[facility] CBC GROUP OUTPATIENT FOLLOW UP CLINIC VISIT    REASON FOR FOLLOW-UP:    1.  Thrombocytopenia  2.  Elevated ferritin with newly diagnosed hereditary hemochromatosis on 8/14/2019.  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  Baseline ferritin in the office on 8/14/2019 is 929, highest 1063 on 10/9/2019.       HISTORY OF PRESENT ILLNESS:  Pablito Bergman is a 66 y.o. male who returns today for follow up of the above issue.      He tolerates phlebotomy very well.  No new problems discussed today.  Ferritin has been coming down nicely.    REVIEW OF SYSTEMS:  As per the HPI      Vitals:    04/26/21 1555   BP: 136/91   Pulse: 87   Resp: 16   Temp: 97.1 °F (36.2 °C)   TempSrc: Temporal   SpO2: 97%   Weight: 74.9 kg (165 lb 1.6 oz)   Height: 190 cm (74.8\")   PainSc: 0-No pain       PHYSICAL EXAMINATION:  General:  No acute distress, awake, alert and oriented  Skin:  Warm and dry, no visible rash  HEENT:  Normocephalic/atraumatic.  Wearing a facemask.  Chest:  Normal respiratory effort  Extremities:  No visible clubbing, cyanosis, or edema  Neuro/psych:  Grossly non-focal.  Normal mood and affect.                DIAGNOSTIC DATA:  CBC & Differential (04/26/2021 15:49)  Retic With IRF & RET-He (04/26/2021 15:49)    IMAGING: None reviewed    ASSESSMENT:  This is a 66 y.o. male with:    *History of thrombocytopenia:   · This has been mild and chronic.  I did not see any evidence for platelet clumping on his peripheral blood smear.  He has been asymptomatic without bleeding.  His IPF was slightly elevated at 8.7%.  Vitamin B12 was normal at 304.  Continue to monitor.    · Platelet count mildly low at 130,000 today.  Continue monitoring.    *Hereditary hemochromatosis:    · He is a homozygote for the HFE gene C282Y mutation.    · Baseline ferritin in the office on 8/14/2019 is 929.  · Ferritin was slow improvement with phlebotomy about every 2 to 3 weeks.    · AFP normal at 2.17 on 2/18/2021  · With aggressive " phlebotomy, ferritin finally improving at 113 on 4/14/2021  · Ferritin pending today        PLAN:  1. Therapeutic phlebotomy today and once again then in a couple of weeks  2. We will start doing his labs with therapeutic phlebotomy  3. After his labs and phlebotomy in a couple of weeks, space things out to once per month  4. Goal ferritin 50.  If the ferritin improves we can space his appointments out further.  5. I will see him back in a few months for follow-up

## 2021-04-26 ENCOUNTER — LAB (OUTPATIENT)
Dept: LAB | Facility: HOSPITAL | Age: 66
End: 2021-04-26

## 2021-04-26 ENCOUNTER — OFFICE VISIT (OUTPATIENT)
Dept: ONCOLOGY | Facility: CLINIC | Age: 66
End: 2021-04-26

## 2021-04-26 ENCOUNTER — INFUSION (OUTPATIENT)
Dept: ONCOLOGY | Facility: HOSPITAL | Age: 66
End: 2021-04-26

## 2021-04-26 VITALS
SYSTOLIC BLOOD PRESSURE: 136 MMHG | HEART RATE: 87 BPM | DIASTOLIC BLOOD PRESSURE: 91 MMHG | TEMPERATURE: 97.1 F | OXYGEN SATURATION: 97 % | WEIGHT: 165.1 LBS | RESPIRATION RATE: 16 BRPM | HEIGHT: 75 IN | BODY MASS INDEX: 20.53 KG/M2

## 2021-04-26 DIAGNOSIS — E83.110 HEREDITARY HEMOCHROMATOSIS (HCC): Primary | ICD-10-CM

## 2021-04-26 DIAGNOSIS — E83.110 HEREDITARY HEMOCHROMATOSIS (HCC): ICD-10-CM

## 2021-04-26 LAB
BASOPHILS # BLD AUTO: 0.08 10*3/MM3 (ref 0–0.2)
BASOPHILS NFR BLD AUTO: 1.4 % (ref 0–1.5)
DEPRECATED RDW RBC AUTO: 47.3 FL (ref 37–54)
EOSINOPHIL # BLD AUTO: 0.34 10*3/MM3 (ref 0–0.4)
EOSINOPHIL NFR BLD AUTO: 5.9 % (ref 0.3–6.2)
ERYTHROCYTE [DISTWIDTH] IN BLOOD BY AUTOMATED COUNT: 12.7 % (ref 12.3–15.4)
FERRITIN SERPL-MCNC: 95.3 NG/ML (ref 30–400)
HCT VFR BLD AUTO: 45.1 % (ref 37.5–51)
HGB BLD-MCNC: 14.8 G/DL (ref 13–17.7)
HGB RETIC QN AUTO: 37 PG (ref 29.8–36.1)
IMM GRANULOCYTES # BLD AUTO: 0 10*3/MM3 (ref 0–0.05)
IMM GRANULOCYTES NFR BLD AUTO: 0 % (ref 0–0.5)
IMM RETICS NFR: 7.3 % (ref 3–15.8)
IRON 24H UR-MRATE: 92 MCG/DL (ref 59–158)
IRON SATN MFR SERPL: 32 % (ref 14–48)
LYMPHOCYTES # BLD AUTO: 1.53 10*3/MM3 (ref 0.7–3.1)
LYMPHOCYTES NFR BLD AUTO: 26.5 % (ref 19.6–45.3)
MCH RBC QN AUTO: 32.8 PG (ref 26.6–33)
MCHC RBC AUTO-ENTMCNC: 32.8 G/DL (ref 31.5–35.7)
MCV RBC AUTO: 100 FL (ref 79–97)
MONOCYTES # BLD AUTO: 0.49 10*3/MM3 (ref 0.1–0.9)
MONOCYTES NFR BLD AUTO: 8.5 % (ref 5–12)
NEUTROPHILS NFR BLD AUTO: 3.34 10*3/MM3 (ref 1.7–7)
NEUTROPHILS NFR BLD AUTO: 57.7 % (ref 42.7–76)
NRBC BLD AUTO-RTO: 0 /100 WBC (ref 0–0.2)
PLATELET # BLD AUTO: 130 10*3/MM3 (ref 140–450)
PMV BLD AUTO: 10.7 FL (ref 6–12)
RBC # BLD AUTO: 4.51 10*6/MM3 (ref 4.14–5.8)
RETICS # AUTO: 0.06 10*6/MM3 (ref 0.02–0.13)
RETICS/RBC NFR AUTO: 1.43 % (ref 0.7–1.9)
TIBC SERPL-MCNC: 291 MCG/DL (ref 249–505)
TRANSFERRIN SERPL-MCNC: 208 MG/DL (ref 200–360)
WBC # BLD AUTO: 5.78 10*3/MM3 (ref 3.4–10.8)

## 2021-04-26 PROCEDURE — 83540 ASSAY OF IRON: CPT

## 2021-04-26 PROCEDURE — 36415 COLL VENOUS BLD VENIPUNCTURE: CPT

## 2021-04-26 PROCEDURE — 85025 COMPLETE CBC W/AUTO DIFF WBC: CPT

## 2021-04-26 PROCEDURE — 85046 RETICYTE/HGB CONCENTRATE: CPT

## 2021-04-26 PROCEDURE — 84466 ASSAY OF TRANSFERRIN: CPT

## 2021-04-26 PROCEDURE — 99214 OFFICE O/P EST MOD 30 MIN: CPT | Performed by: INTERNAL MEDICINE

## 2021-04-26 PROCEDURE — 82728 ASSAY OF FERRITIN: CPT

## 2021-04-26 PROCEDURE — 99195 PHLEBOTOMY: CPT

## 2021-04-29 ENCOUNTER — TELEPHONE (OUTPATIENT)
Dept: ONCOLOGY | Facility: CLINIC | Age: 66
End: 2021-04-29

## 2021-05-10 ENCOUNTER — LAB (OUTPATIENT)
Dept: LAB | Facility: HOSPITAL | Age: 66
End: 2021-05-10

## 2021-05-10 ENCOUNTER — INFUSION (OUTPATIENT)
Dept: ONCOLOGY | Facility: HOSPITAL | Age: 66
End: 2021-05-10

## 2021-05-10 VITALS
SYSTOLIC BLOOD PRESSURE: 152 MMHG | RESPIRATION RATE: 16 BRPM | DIASTOLIC BLOOD PRESSURE: 99 MMHG | OXYGEN SATURATION: 98 % | HEART RATE: 64 BPM | BODY MASS INDEX: 20.71 KG/M2 | WEIGHT: 164.8 LBS | TEMPERATURE: 97.1 F

## 2021-05-10 DIAGNOSIS — E83.110 HEREDITARY HEMOCHROMATOSIS (HCC): Primary | ICD-10-CM

## 2021-05-10 DIAGNOSIS — E83.110 HEREDITARY HEMOCHROMATOSIS (HCC): ICD-10-CM

## 2021-05-10 LAB
BASOPHILS # BLD AUTO: 0.1 10*3/MM3 (ref 0–0.2)
BASOPHILS NFR BLD AUTO: 1.6 % (ref 0–1.5)
DEPRECATED RDW RBC AUTO: 45.2 FL (ref 37–54)
EOSINOPHIL # BLD AUTO: 0.29 10*3/MM3 (ref 0–0.4)
EOSINOPHIL NFR BLD AUTO: 4.7 % (ref 0.3–6.2)
ERYTHROCYTE [DISTWIDTH] IN BLOOD BY AUTOMATED COUNT: 12.2 % (ref 12.3–15.4)
FERRITIN SERPL-MCNC: 70.8 NG/ML (ref 30–400)
HCT VFR BLD AUTO: 47.9 % (ref 37.5–51)
HGB BLD-MCNC: 15.9 G/DL (ref 13–17.7)
HGB RETIC QN AUTO: 36.7 PG (ref 29.8–36.1)
IMM GRANULOCYTES # BLD AUTO: 0.02 10*3/MM3 (ref 0–0.05)
IMM GRANULOCYTES NFR BLD AUTO: 0.3 % (ref 0–0.5)
IMM RETICS NFR: 7.3 % (ref 3–15.8)
IRON 24H UR-MRATE: 114 MCG/DL (ref 59–158)
IRON SATN MFR SERPL: 37 % (ref 14–48)
LYMPHOCYTES # BLD AUTO: 1.48 10*3/MM3 (ref 0.7–3.1)
LYMPHOCYTES NFR BLD AUTO: 24.1 % (ref 19.6–45.3)
MCH RBC QN AUTO: 33.1 PG (ref 26.6–33)
MCHC RBC AUTO-ENTMCNC: 33.2 G/DL (ref 31.5–35.7)
MCV RBC AUTO: 99.8 FL (ref 79–97)
MONOCYTES # BLD AUTO: 0.59 10*3/MM3 (ref 0.1–0.9)
MONOCYTES NFR BLD AUTO: 9.6 % (ref 5–12)
NEUTROPHILS NFR BLD AUTO: 3.65 10*3/MM3 (ref 1.7–7)
NEUTROPHILS NFR BLD AUTO: 59.7 % (ref 42.7–76)
NRBC BLD AUTO-RTO: 0 /100 WBC (ref 0–0.2)
PLATELET # BLD AUTO: 142 10*3/MM3 (ref 140–450)
PMV BLD AUTO: 10.7 FL (ref 6–12)
RBC # BLD AUTO: 4.8 10*6/MM3 (ref 4.14–5.8)
RETICS # AUTO: 0.07 10*6/MM3 (ref 0.02–0.13)
RETICS/RBC NFR AUTO: 1.41 % (ref 0.7–1.9)
TIBC SERPL-MCNC: 308 MCG/DL (ref 249–505)
TRANSFERRIN SERPL-MCNC: 220 MG/DL (ref 200–360)
WBC # BLD AUTO: 6.13 10*3/MM3 (ref 3.4–10.8)

## 2021-05-10 PROCEDURE — 84466 ASSAY OF TRANSFERRIN: CPT

## 2021-05-10 PROCEDURE — 85025 COMPLETE CBC W/AUTO DIFF WBC: CPT

## 2021-05-10 PROCEDURE — 36415 COLL VENOUS BLD VENIPUNCTURE: CPT

## 2021-05-10 PROCEDURE — 85046 RETICYTE/HGB CONCENTRATE: CPT

## 2021-05-10 PROCEDURE — 99195 PHLEBOTOMY: CPT

## 2021-05-10 PROCEDURE — 82728 ASSAY OF FERRITIN: CPT

## 2021-05-10 PROCEDURE — 83540 ASSAY OF IRON: CPT

## 2021-05-10 RX ORDER — SODIUM CHLORIDE 9 MG/ML
250 INJECTION, SOLUTION INTRAVENOUS ONCE
Status: CANCELLED | OUTPATIENT
Start: 2021-05-10

## 2021-06-01 ENCOUNTER — HOSPITAL ENCOUNTER (OUTPATIENT)
Dept: CT IMAGING | Facility: HOSPITAL | Age: 66
Discharge: HOME | End: 2021-06-01
Admitting: INTERNAL MEDICINE

## 2021-06-01 DIAGNOSIS — R91.1 PULMONARY NODULE, LEFT: ICD-10-CM

## 2021-06-01 PROCEDURE — 71250 CT THORAX DX C-: CPT

## 2021-06-07 ENCOUNTER — INFUSION (OUTPATIENT)
Dept: ONCOLOGY | Facility: HOSPITAL | Age: 66
End: 2021-06-07

## 2021-06-07 ENCOUNTER — LAB (OUTPATIENT)
Dept: LAB | Facility: HOSPITAL | Age: 66
End: 2021-06-07

## 2021-06-07 VITALS
WEIGHT: 164.4 LBS | HEART RATE: 73 BPM | SYSTOLIC BLOOD PRESSURE: 136 MMHG | OXYGEN SATURATION: 96 % | DIASTOLIC BLOOD PRESSURE: 91 MMHG | BODY MASS INDEX: 20.66 KG/M2 | RESPIRATION RATE: 16 BRPM | TEMPERATURE: 96.9 F

## 2021-06-07 DIAGNOSIS — R79.89 ELEVATED FERRITIN: ICD-10-CM

## 2021-06-07 DIAGNOSIS — E83.110 HEREDITARY HEMOCHROMATOSIS (HCC): ICD-10-CM

## 2021-06-07 LAB
BASOPHILS # BLD AUTO: 0.08 10*3/MM3 (ref 0–0.2)
BASOPHILS NFR BLD AUTO: 1.3 % (ref 0–1.5)
DEPRECATED RDW RBC AUTO: 43.5 FL (ref 37–54)
EOSINOPHIL # BLD AUTO: 0.28 10*3/MM3 (ref 0–0.4)
EOSINOPHIL NFR BLD AUTO: 4.5 % (ref 0.3–6.2)
ERYTHROCYTE [DISTWIDTH] IN BLOOD BY AUTOMATED COUNT: 12 % (ref 12.3–15.4)
FERRITIN SERPL-MCNC: 39 NG/ML (ref 30–400)
HCT VFR BLD AUTO: 49.1 % (ref 37.5–51)
HGB BLD-MCNC: 16.9 G/DL (ref 13–17.7)
HGB RETIC QN AUTO: 37 PG (ref 29.8–36.1)
IMM GRANULOCYTES # BLD AUTO: 0.01 10*3/MM3 (ref 0–0.05)
IMM GRANULOCYTES NFR BLD AUTO: 0.2 % (ref 0–0.5)
IMM RETICS NFR: 6.9 % (ref 3–15.8)
IRON 24H UR-MRATE: 101 MCG/DL (ref 59–158)
IRON SATN MFR SERPL: 30 % (ref 14–48)
LYMPHOCYTES # BLD AUTO: 1.43 10*3/MM3 (ref 0.7–3.1)
LYMPHOCYTES NFR BLD AUTO: 23.1 % (ref 19.6–45.3)
MCH RBC QN AUTO: 33.6 PG (ref 26.6–33)
MCHC RBC AUTO-ENTMCNC: 34.4 G/DL (ref 31.5–35.7)
MCV RBC AUTO: 97.6 FL (ref 79–97)
MONOCYTES # BLD AUTO: 0.55 10*3/MM3 (ref 0.1–0.9)
MONOCYTES NFR BLD AUTO: 8.9 % (ref 5–12)
NEUTROPHILS NFR BLD AUTO: 3.85 10*3/MM3 (ref 1.7–7)
NEUTROPHILS NFR BLD AUTO: 62 % (ref 42.7–76)
NRBC BLD AUTO-RTO: 0 /100 WBC (ref 0–0.2)
PLATELET # BLD AUTO: 129 10*3/MM3 (ref 140–450)
PMV BLD AUTO: 10.5 FL (ref 6–12)
RBC # BLD AUTO: 5.03 10*6/MM3 (ref 4.14–5.8)
RETICS # AUTO: 0.06 10*6/MM3 (ref 0.02–0.13)
RETICS/RBC NFR AUTO: 1.11 % (ref 0.7–1.9)
TIBC SERPL-MCNC: 337 MCG/DL (ref 249–505)
TRANSFERRIN SERPL-MCNC: 241 MG/DL (ref 200–360)
WBC # BLD AUTO: 6.2 10*3/MM3 (ref 3.4–10.8)

## 2021-06-07 PROCEDURE — 85025 COMPLETE CBC W/AUTO DIFF WBC: CPT

## 2021-06-07 PROCEDURE — 84466 ASSAY OF TRANSFERRIN: CPT

## 2021-06-07 PROCEDURE — 83540 ASSAY OF IRON: CPT

## 2021-06-07 PROCEDURE — 85046 RETICYTE/HGB CONCENTRATE: CPT

## 2021-06-07 PROCEDURE — 82728 ASSAY OF FERRITIN: CPT

## 2021-06-07 PROCEDURE — 99195 PHLEBOTOMY: CPT

## 2021-06-07 PROCEDURE — 36415 COLL VENOUS BLD VENIPUNCTURE: CPT

## 2021-07-06 ENCOUNTER — INFUSION (OUTPATIENT)
Dept: ONCOLOGY | Facility: HOSPITAL | Age: 66
End: 2021-07-06

## 2021-07-06 ENCOUNTER — LAB (OUTPATIENT)
Dept: LAB | Facility: HOSPITAL | Age: 66
End: 2021-07-06

## 2021-07-06 VITALS
DIASTOLIC BLOOD PRESSURE: 93 MMHG | SYSTOLIC BLOOD PRESSURE: 135 MMHG | BODY MASS INDEX: 20.23 KG/M2 | RESPIRATION RATE: 16 BRPM | HEART RATE: 70 BPM | WEIGHT: 161 LBS | OXYGEN SATURATION: 97 % | TEMPERATURE: 97.1 F

## 2021-07-06 DIAGNOSIS — E83.110 HEREDITARY HEMOCHROMATOSIS (HCC): ICD-10-CM

## 2021-07-06 DIAGNOSIS — R79.89 ELEVATED FERRITIN: ICD-10-CM

## 2021-07-06 LAB
BASOPHILS # BLD AUTO: 0.09 10*3/MM3 (ref 0–0.2)
BASOPHILS NFR BLD AUTO: 1.8 % (ref 0–1.5)
DEPRECATED RDW RBC AUTO: 45.5 FL (ref 37–54)
EOSINOPHIL # BLD AUTO: 0.25 10*3/MM3 (ref 0–0.4)
EOSINOPHIL NFR BLD AUTO: 5.1 % (ref 0.3–6.2)
ERYTHROCYTE [DISTWIDTH] IN BLOOD BY AUTOMATED COUNT: 12.4 % (ref 12.3–15.4)
FERRITIN SERPL-MCNC: 66.5 NG/ML (ref 30–400)
HCT VFR BLD AUTO: 50.8 % (ref 37.5–51)
HGB BLD-MCNC: 16.5 G/DL (ref 13–17.7)
HGB RETIC QN AUTO: 37.5 PG (ref 29.8–36.1)
IMM GRANULOCYTES # BLD AUTO: 0.01 10*3/MM3 (ref 0–0.05)
IMM GRANULOCYTES NFR BLD AUTO: 0.2 % (ref 0–0.5)
IMM RETICS NFR: 5.5 % (ref 3–15.8)
IRON 24H UR-MRATE: 131 MCG/DL (ref 59–158)
IRON SATN MFR SERPL: 46 % (ref 14–48)
LYMPHOCYTES # BLD AUTO: 1.29 10*3/MM3 (ref 0.7–3.1)
LYMPHOCYTES NFR BLD AUTO: 26.1 % (ref 19.6–45.3)
MCH RBC QN AUTO: 32 PG (ref 26.6–33)
MCHC RBC AUTO-ENTMCNC: 32.5 G/DL (ref 31.5–35.7)
MCV RBC AUTO: 98.6 FL (ref 79–97)
MONOCYTES # BLD AUTO: 0.43 10*3/MM3 (ref 0.1–0.9)
MONOCYTES NFR BLD AUTO: 8.7 % (ref 5–12)
NEUTROPHILS NFR BLD AUTO: 2.87 10*3/MM3 (ref 1.7–7)
NEUTROPHILS NFR BLD AUTO: 58.1 % (ref 42.7–76)
NRBC BLD AUTO-RTO: 0 /100 WBC (ref 0–0.2)
PLATELET # BLD AUTO: 121 10*3/MM3 (ref 140–450)
PMV BLD AUTO: 11.1 FL (ref 6–12)
RBC # BLD AUTO: 5.15 10*6/MM3 (ref 4.14–5.8)
RETICS # AUTO: 0.05 10*6/MM3 (ref 0.02–0.13)
RETICS/RBC NFR AUTO: 1 % (ref 0.7–1.9)
TIBC SERPL-MCNC: 286 MCG/DL (ref 249–505)
TRANSFERRIN SERPL-MCNC: 204 MG/DL (ref 200–360)
WBC # BLD AUTO: 4.94 10*3/MM3 (ref 3.4–10.8)

## 2021-07-06 PROCEDURE — 85025 COMPLETE CBC W/AUTO DIFF WBC: CPT

## 2021-07-06 PROCEDURE — 82728 ASSAY OF FERRITIN: CPT

## 2021-07-06 PROCEDURE — 84466 ASSAY OF TRANSFERRIN: CPT

## 2021-07-06 PROCEDURE — 99195 PHLEBOTOMY: CPT

## 2021-07-06 PROCEDURE — 36415 COLL VENOUS BLD VENIPUNCTURE: CPT

## 2021-07-06 PROCEDURE — 83540 ASSAY OF IRON: CPT

## 2021-07-06 PROCEDURE — 85046 RETICYTE/HGB CONCENTRATE: CPT

## 2021-07-22 ENCOUNTER — TRANSCRIBE ORDERS (OUTPATIENT)
Dept: ADMINISTRATIVE | Facility: HOSPITAL | Age: 66
End: 2021-07-22

## 2021-07-22 DIAGNOSIS — R01.2 MID-SYSTOLIC CLICK: Primary | ICD-10-CM

## 2021-07-22 DIAGNOSIS — I71.21 THORACIC ASCENDING AORTIC ANEURYSM (HCC): ICD-10-CM

## 2021-08-02 ENCOUNTER — INFUSION (OUTPATIENT)
Dept: ONCOLOGY | Facility: HOSPITAL | Age: 66
End: 2021-08-02

## 2021-08-02 ENCOUNTER — OFFICE VISIT (OUTPATIENT)
Dept: ONCOLOGY | Facility: CLINIC | Age: 66
End: 2021-08-02

## 2021-08-02 ENCOUNTER — LAB (OUTPATIENT)
Dept: LAB | Facility: HOSPITAL | Age: 66
End: 2021-08-02

## 2021-08-02 VITALS
WEIGHT: 160.6 LBS | DIASTOLIC BLOOD PRESSURE: 99 MMHG | BODY MASS INDEX: 19.97 KG/M2 | OXYGEN SATURATION: 97 % | SYSTOLIC BLOOD PRESSURE: 160 MMHG | HEART RATE: 54 BPM | HEIGHT: 75 IN | TEMPERATURE: 97.5 F | RESPIRATION RATE: 16 BRPM

## 2021-08-02 DIAGNOSIS — E83.110 HEREDITARY HEMOCHROMATOSIS (HCC): ICD-10-CM

## 2021-08-02 DIAGNOSIS — E83.110 HEREDITARY HEMOCHROMATOSIS (HCC): Primary | ICD-10-CM

## 2021-08-02 DIAGNOSIS — D69.6 THROMBOCYTOPENIA (HCC): ICD-10-CM

## 2021-08-02 LAB
BASOPHILS # BLD AUTO: 0.05 10*3/MM3 (ref 0–0.2)
BASOPHILS NFR BLD AUTO: 1.1 % (ref 0–1.5)
DEPRECATED RDW RBC AUTO: 46.1 FL (ref 37–54)
EOSINOPHIL # BLD AUTO: 0.33 10*3/MM3 (ref 0–0.4)
EOSINOPHIL NFR BLD AUTO: 7.6 % (ref 0.3–6.2)
ERYTHROCYTE [DISTWIDTH] IN BLOOD BY AUTOMATED COUNT: 12.7 % (ref 12.3–15.4)
FERRITIN SERPL-MCNC: 48.6 NG/ML (ref 30–400)
HCT VFR BLD AUTO: 50.2 % (ref 37.5–51)
HGB BLD-MCNC: 16.5 G/DL (ref 13–17.7)
HGB RETIC QN AUTO: 37.4 PG (ref 29.8–36.1)
IMM GRANULOCYTES # BLD AUTO: 0.01 10*3/MM3 (ref 0–0.05)
IMM GRANULOCYTES NFR BLD AUTO: 0.2 % (ref 0–0.5)
IMM RETICS NFR: 4.4 % (ref 3–15.8)
IRON 24H UR-MRATE: 71 MCG/DL (ref 59–158)
IRON SATN MFR SERPL: 25 % (ref 14–48)
LYMPHOCYTES # BLD AUTO: 1.03 10*3/MM3 (ref 0.7–3.1)
LYMPHOCYTES NFR BLD AUTO: 23.6 % (ref 19.6–45.3)
MCH RBC QN AUTO: 32.3 PG (ref 26.6–33)
MCHC RBC AUTO-ENTMCNC: 32.9 G/DL (ref 31.5–35.7)
MCV RBC AUTO: 98.2 FL (ref 79–97)
MONOCYTES # BLD AUTO: 0.47 10*3/MM3 (ref 0.1–0.9)
MONOCYTES NFR BLD AUTO: 10.8 % (ref 5–12)
NEUTROPHILS NFR BLD AUTO: 2.48 10*3/MM3 (ref 1.7–7)
NEUTROPHILS NFR BLD AUTO: 56.7 % (ref 42.7–76)
NRBC BLD AUTO-RTO: 0 /100 WBC (ref 0–0.2)
PLATELET # BLD AUTO: 119 10*3/MM3 (ref 140–450)
PMV BLD AUTO: 10.7 FL (ref 6–12)
RBC # BLD AUTO: 5.11 10*6/MM3 (ref 4.14–5.8)
RETICS # AUTO: 0.05 10*6/MM3 (ref 0.02–0.13)
RETICS/RBC NFR AUTO: 0.98 % (ref 0.7–1.9)
TIBC SERPL-MCNC: 281 MCG/DL (ref 249–505)
TRANSFERRIN SERPL-MCNC: 201 MG/DL (ref 200–360)
WBC # BLD AUTO: 4.37 10*3/MM3 (ref 3.4–10.8)

## 2021-08-02 PROCEDURE — 99214 OFFICE O/P EST MOD 30 MIN: CPT | Performed by: INTERNAL MEDICINE

## 2021-08-02 PROCEDURE — 85046 RETICYTE/HGB CONCENTRATE: CPT

## 2021-08-02 PROCEDURE — 82728 ASSAY OF FERRITIN: CPT

## 2021-08-02 PROCEDURE — 84466 ASSAY OF TRANSFERRIN: CPT

## 2021-08-02 PROCEDURE — 85025 COMPLETE CBC W/AUTO DIFF WBC: CPT

## 2021-08-02 PROCEDURE — 83540 ASSAY OF IRON: CPT

## 2021-08-02 PROCEDURE — 36415 COLL VENOUS BLD VENIPUNCTURE: CPT

## 2021-08-30 ENCOUNTER — INFUSION (OUTPATIENT)
Dept: ONCOLOGY | Facility: HOSPITAL | Age: 66
End: 2021-08-30

## 2021-08-30 ENCOUNTER — LAB (OUTPATIENT)
Dept: LAB | Facility: HOSPITAL | Age: 66
End: 2021-08-30

## 2021-08-30 VITALS
SYSTOLIC BLOOD PRESSURE: 140 MMHG | HEART RATE: 63 BPM | WEIGHT: 157.6 LBS | OXYGEN SATURATION: 95 % | DIASTOLIC BLOOD PRESSURE: 86 MMHG | TEMPERATURE: 98.2 F | BODY MASS INDEX: 19.8 KG/M2

## 2021-08-30 DIAGNOSIS — E83.110 HEREDITARY HEMOCHROMATOSIS (HCC): ICD-10-CM

## 2021-08-30 DIAGNOSIS — E83.110 HEREDITARY HEMOCHROMATOSIS (HCC): Primary | ICD-10-CM

## 2021-08-30 DIAGNOSIS — D69.6 THROMBOCYTOPENIA (HCC): ICD-10-CM

## 2021-08-30 LAB
BASOPHILS # BLD AUTO: 0.07 10*3/MM3 (ref 0–0.2)
BASOPHILS NFR BLD AUTO: 1.5 % (ref 0–1.5)
DEPRECATED RDW RBC AUTO: 46.5 FL (ref 37–54)
EOSINOPHIL # BLD AUTO: 0.27 10*3/MM3 (ref 0–0.4)
EOSINOPHIL NFR BLD AUTO: 5.6 % (ref 0.3–6.2)
ERYTHROCYTE [DISTWIDTH] IN BLOOD BY AUTOMATED COUNT: 13 % (ref 12.3–15.4)
FERRITIN SERPL-MCNC: 92.2 NG/ML (ref 30–400)
HCT VFR BLD AUTO: 47.8 % (ref 37.5–51)
HGB BLD-MCNC: 16 G/DL (ref 13–17.7)
HGB RETIC QN AUTO: 37.5 PG (ref 29.8–36.1)
IMM GRANULOCYTES # BLD AUTO: 0.01 10*3/MM3 (ref 0–0.05)
IMM GRANULOCYTES NFR BLD AUTO: 0.2 % (ref 0–0.5)
IMM RETICS NFR: 4.1 % (ref 3–15.8)
IRON 24H UR-MRATE: 166 MCG/DL (ref 59–158)
IRON SATN MFR SERPL: 64 % (ref 14–48)
LYMPHOCYTES # BLD AUTO: 1.04 10*3/MM3 (ref 0.7–3.1)
LYMPHOCYTES NFR BLD AUTO: 21.7 % (ref 19.6–45.3)
MCH RBC QN AUTO: 32.3 PG (ref 26.6–33)
MCHC RBC AUTO-ENTMCNC: 33.5 G/DL (ref 31.5–35.7)
MCV RBC AUTO: 96.6 FL (ref 79–97)
MONOCYTES # BLD AUTO: 0.51 10*3/MM3 (ref 0.1–0.9)
MONOCYTES NFR BLD AUTO: 10.6 % (ref 5–12)
NEUTROPHILS NFR BLD AUTO: 2.9 10*3/MM3 (ref 1.7–7)
NEUTROPHILS NFR BLD AUTO: 60.4 % (ref 42.7–76)
NRBC BLD AUTO-RTO: 0 /100 WBC (ref 0–0.2)
PLATELET # BLD AUTO: 142 10*3/MM3 (ref 140–450)
PMV BLD AUTO: 10.6 FL (ref 6–12)
RBC # BLD AUTO: 4.95 10*6/MM3 (ref 4.14–5.8)
RETICS # AUTO: 0.05 10*6/MM3 (ref 0.02–0.13)
RETICS/RBC NFR AUTO: 0.94 % (ref 0.7–1.9)
TIBC SERPL-MCNC: 258 MCG/DL (ref 249–505)
TRANSFERRIN SERPL-MCNC: 184 MG/DL (ref 200–360)
WBC # BLD AUTO: 4.8 10*3/MM3 (ref 3.4–10.8)

## 2021-08-30 PROCEDURE — 82728 ASSAY OF FERRITIN: CPT

## 2021-08-30 PROCEDURE — 99195 PHLEBOTOMY: CPT

## 2021-08-30 PROCEDURE — 84466 ASSAY OF TRANSFERRIN: CPT

## 2021-08-30 PROCEDURE — 36415 COLL VENOUS BLD VENIPUNCTURE: CPT

## 2021-08-30 PROCEDURE — 85025 COMPLETE CBC W/AUTO DIFF WBC: CPT

## 2021-08-30 PROCEDURE — 83540 ASSAY OF IRON: CPT

## 2021-08-30 PROCEDURE — 85046 RETICYTE/HGB CONCENTRATE: CPT

## 2021-08-30 RX ORDER — SODIUM CHLORIDE 9 MG/ML
250 INJECTION, SOLUTION INTRAVENOUS ONCE
Status: CANCELLED | OUTPATIENT
Start: 2021-08-30

## 2021-09-23 RX ORDER — SODIUM CHLORIDE 9 MG/ML
250 INJECTION, SOLUTION INTRAVENOUS ONCE
Status: CANCELLED | OUTPATIENT
Start: 2021-09-27

## 2021-09-27 ENCOUNTER — INFUSION (OUTPATIENT)
Dept: ONCOLOGY | Facility: HOSPITAL | Age: 66
End: 2021-09-27

## 2021-09-27 ENCOUNTER — LAB (OUTPATIENT)
Dept: LAB | Facility: HOSPITAL | Age: 66
End: 2021-09-27

## 2021-09-27 VITALS
WEIGHT: 159.2 LBS | TEMPERATURE: 97.5 F | DIASTOLIC BLOOD PRESSURE: 87 MMHG | HEART RATE: 64 BPM | BODY MASS INDEX: 20 KG/M2 | OXYGEN SATURATION: 98 % | SYSTOLIC BLOOD PRESSURE: 145 MMHG

## 2021-09-27 DIAGNOSIS — D69.6 THROMBOCYTOPENIA (HCC): ICD-10-CM

## 2021-09-27 DIAGNOSIS — E83.110 HEREDITARY HEMOCHROMATOSIS (HCC): ICD-10-CM

## 2021-09-27 DIAGNOSIS — E83.110 HEREDITARY HEMOCHROMATOSIS (HCC): Primary | ICD-10-CM

## 2021-09-27 LAB
BASOPHILS # BLD AUTO: 0.06 10*3/MM3 (ref 0–0.2)
BASOPHILS NFR BLD AUTO: 1.3 % (ref 0–1.5)
DEPRECATED RDW RBC AUTO: 49.7 FL (ref 37–54)
EOSINOPHIL # BLD AUTO: 0.34 10*3/MM3 (ref 0–0.4)
EOSINOPHIL NFR BLD AUTO: 7.2 % (ref 0.3–6.2)
ERYTHROCYTE [DISTWIDTH] IN BLOOD BY AUTOMATED COUNT: 13.3 % (ref 12.3–15.4)
FERRITIN SERPL-MCNC: 50.7 NG/ML (ref 30–400)
HCT VFR BLD AUTO: 48.7 % (ref 37.5–51)
HGB BLD-MCNC: 15.7 G/DL (ref 13–17.7)
HGB RETIC QN AUTO: 37.5 PG (ref 29.8–36.1)
IMM GRANULOCYTES # BLD AUTO: 0.01 10*3/MM3 (ref 0–0.05)
IMM GRANULOCYTES NFR BLD AUTO: 0.2 % (ref 0–0.5)
IMM RETICS NFR: 5.3 % (ref 3–15.8)
IRON 24H UR-MRATE: 87 MCG/DL (ref 59–158)
IRON SATN MFR SERPL: 30 % (ref 14–48)
LYMPHOCYTES # BLD AUTO: 1.13 10*3/MM3 (ref 0.7–3.1)
LYMPHOCYTES NFR BLD AUTO: 24 % (ref 19.6–45.3)
MCH RBC QN AUTO: 32.3 PG (ref 26.6–33)
MCHC RBC AUTO-ENTMCNC: 32.2 G/DL (ref 31.5–35.7)
MCV RBC AUTO: 100.2 FL (ref 79–97)
MONOCYTES # BLD AUTO: 0.5 10*3/MM3 (ref 0.1–0.9)
MONOCYTES NFR BLD AUTO: 10.6 % (ref 5–12)
NEUTROPHILS NFR BLD AUTO: 2.66 10*3/MM3 (ref 1.7–7)
NEUTROPHILS NFR BLD AUTO: 56.7 % (ref 42.7–76)
NRBC BLD AUTO-RTO: 0 /100 WBC (ref 0–0.2)
PLATELET # BLD AUTO: 121 10*3/MM3 (ref 140–450)
PMV BLD AUTO: 10.9 FL (ref 6–12)
RBC # BLD AUTO: 4.86 10*6/MM3 (ref 4.14–5.8)
RETICS # AUTO: 0.05 10*6/MM3 (ref 0.02–0.13)
RETICS/RBC NFR AUTO: 1 % (ref 0.7–1.9)
TIBC SERPL-MCNC: 293 MCG/DL (ref 249–505)
TRANSFERRIN SERPL-MCNC: 209 MG/DL (ref 200–360)
WBC # BLD AUTO: 4.7 10*3/MM3 (ref 3.4–10.8)

## 2021-09-27 PROCEDURE — 82728 ASSAY OF FERRITIN: CPT

## 2021-09-27 PROCEDURE — 99195 PHLEBOTOMY: CPT

## 2021-09-27 PROCEDURE — 83540 ASSAY OF IRON: CPT

## 2021-09-27 PROCEDURE — 36415 COLL VENOUS BLD VENIPUNCTURE: CPT

## 2021-09-27 PROCEDURE — 85025 COMPLETE CBC W/AUTO DIFF WBC: CPT

## 2021-09-27 PROCEDURE — 85046 RETICYTE/HGB CONCENTRATE: CPT

## 2021-09-27 PROCEDURE — 84466 ASSAY OF TRANSFERRIN: CPT

## 2021-09-27 RX ORDER — SODIUM CHLORIDE 9 MG/ML
250 INJECTION, SOLUTION INTRAVENOUS ONCE
Status: CANCELLED | OUTPATIENT
Start: 2021-09-27

## 2021-10-24 NOTE — PROGRESS NOTES
"[facility] CBC GROUP OUTPATIENT FOLLOW UP CLINIC VISIT    REASON FOR FOLLOW-UP:    1.  Thrombocytopenia  2.  Elevated ferritin with newly diagnosed hereditary hemochromatosis on 8/14/2019.  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  Baseline ferritin in the office on 8/14/2019 is 929, highest 1063 on 10/9/2019.       HISTORY OF PRESENT ILLNESS:  Pablito Bergman is a 66 y.o. male who returns today for follow up of the above issue.      Ferritin much better overall, at 50.7 as of 9/27. He did have phlebotomy on that day. Prior to that it was 92.2, up from 48.6 (he did not have phlebotomy at 48.6 but did at 92.2).     He continues to do well, without any new problems today.    REVIEW OF SYSTEMS:  As per the HPI      Vitals:    10/25/21 1121   BP: 137/91   Pulse: 71   Resp: 16   Temp: 97.6 °F (36.4 °C)   TempSrc: Temporal   SpO2: 97%   Weight: 74.5 kg (164 lb 3.2 oz)   Height: 190 cm (74.8\")   PainSc: 0-No pain  Comment: hereditary hemochromatosis       PHYSICAL EXAMINATION:  General:  No acute distress, awake, alert and oriented  Skin:  Warm and dry, no visible rash  HEENT:  Normocephalic/atraumatic.  Wearing a facemask.  Chest:  Normal respiratory effort  Extremities:  No visible clubbing, cyanosis, or edema  Neuro/psych:  Grossly non-focal.  Normal mood and affect.      DIAGNOSTIC DATA:  CBC & Differential (10/25/2021 11:12)  Retic With IRF & RET-He (10/25/2021 11:12)  Iron Profile (10/25/2021 11:12)  Ferritin (10/25/2021 11:12)      IMAGING: None reviewed    ASSESSMENT:  This is a 66 y.o. male with:    *History of thrombocytopenia:   · This has been mild and chronic.  I did not see any evidence for platelet clumping on his peripheral blood smear.  He has been asymptomatic without bleeding.  His IPF was slightly elevated at 8.7%.  Vitamin B12 was normal at 304.  Continue to monitor.    · Mild thrombocytopenia at 119,000 today.  Continue monitoring.    *Hereditary hemochromatosis:    · He is a homozygote for " the HFE gene C282Y mutation.    · Baseline ferritin in the office on 8/14/2019 is 929.  · Ferritin was slow improvement with phlebotomy about every 2 to 3 weeks.    · AFP normal at 2.17 on 2/18/2021  · With aggressive phlebotomy, ferritin finally improved with a ferritin of 39.0 as of 6/7 and 66.5 as of 7/6.  · Ferritin today is at goal at 38.6.      PLAN:  1. No phlebotomy today  2. Return in about 6 to 8 weeks for stat labs and appointment for therapeutic phlebotomy  3. I will see him about 2 months after that with stat labs and appointment for therapeutic phlebotomy.  Hopefully an every 2-month schedule will work for him.  If possible, space things out further.

## 2021-10-25 ENCOUNTER — INFUSION (OUTPATIENT)
Dept: ONCOLOGY | Facility: HOSPITAL | Age: 66
End: 2021-10-25

## 2021-10-25 ENCOUNTER — LAB (OUTPATIENT)
Dept: LAB | Facility: HOSPITAL | Age: 66
End: 2021-10-25

## 2021-10-25 ENCOUNTER — OFFICE VISIT (OUTPATIENT)
Dept: ONCOLOGY | Facility: CLINIC | Age: 66
End: 2021-10-25

## 2021-10-25 VITALS
RESPIRATION RATE: 16 BRPM | DIASTOLIC BLOOD PRESSURE: 91 MMHG | HEIGHT: 75 IN | OXYGEN SATURATION: 97 % | TEMPERATURE: 97.6 F | SYSTOLIC BLOOD PRESSURE: 137 MMHG | WEIGHT: 164.2 LBS | HEART RATE: 71 BPM | BODY MASS INDEX: 20.42 KG/M2

## 2021-10-25 DIAGNOSIS — E83.110 HEREDITARY HEMOCHROMATOSIS (HCC): ICD-10-CM

## 2021-10-25 DIAGNOSIS — D69.6 THROMBOCYTOPENIA (HCC): ICD-10-CM

## 2021-10-25 DIAGNOSIS — E83.110 HEREDITARY HEMOCHROMATOSIS (HCC): Primary | ICD-10-CM

## 2021-10-25 LAB
BASOPHILS # BLD AUTO: 0.07 10*3/MM3 (ref 0–0.2)
BASOPHILS NFR BLD AUTO: 1.3 % (ref 0–1.5)
DEPRECATED RDW RBC AUTO: 46.4 FL (ref 37–54)
EOSINOPHIL # BLD AUTO: 0.26 10*3/MM3 (ref 0–0.4)
EOSINOPHIL NFR BLD AUTO: 4.9 % (ref 0.3–6.2)
ERYTHROCYTE [DISTWIDTH] IN BLOOD BY AUTOMATED COUNT: 12.5 % (ref 12.3–15.4)
FERRITIN SERPL-MCNC: 38.6 NG/ML (ref 30–400)
HCT VFR BLD AUTO: 47.3 % (ref 37.5–51)
HGB BLD-MCNC: 15.4 G/DL (ref 13–17.7)
HGB RETIC QN AUTO: 37.7 PG (ref 29.8–36.1)
IMM GRANULOCYTES # BLD AUTO: 0.01 10*3/MM3 (ref 0–0.05)
IMM GRANULOCYTES NFR BLD AUTO: 0.2 % (ref 0–0.5)
IMM RETICS NFR: 6.2 % (ref 3–15.8)
IRON 24H UR-MRATE: 63 MCG/DL (ref 59–158)
IRON SATN MFR SERPL: 21 % (ref 14–48)
LYMPHOCYTES # BLD AUTO: 1.19 10*3/MM3 (ref 0.7–3.1)
LYMPHOCYTES NFR BLD AUTO: 22.6 % (ref 19.6–45.3)
MCH RBC QN AUTO: 32.4 PG (ref 26.6–33)
MCHC RBC AUTO-ENTMCNC: 32.6 G/DL (ref 31.5–35.7)
MCV RBC AUTO: 99.4 FL (ref 79–97)
MONOCYTES # BLD AUTO: 0.56 10*3/MM3 (ref 0.1–0.9)
MONOCYTES NFR BLD AUTO: 10.6 % (ref 5–12)
NEUTROPHILS NFR BLD AUTO: 3.18 10*3/MM3 (ref 1.7–7)
NEUTROPHILS NFR BLD AUTO: 60.4 % (ref 42.7–76)
NRBC BLD AUTO-RTO: 0 /100 WBC (ref 0–0.2)
PLATELET # BLD AUTO: 129 10*3/MM3 (ref 140–450)
PMV BLD AUTO: 10.9 FL (ref 6–12)
RBC # BLD AUTO: 4.76 10*6/MM3 (ref 4.14–5.8)
RETICS # AUTO: 0.05 10*6/MM3 (ref 0.02–0.13)
RETICS/RBC NFR AUTO: 1.01 % (ref 0.7–1.9)
TIBC SERPL-MCNC: 305 MCG/DL (ref 249–505)
TRANSFERRIN SERPL-MCNC: 218 MG/DL (ref 200–360)
WBC # BLD AUTO: 5.27 10*3/MM3 (ref 3.4–10.8)

## 2021-10-25 PROCEDURE — 36415 COLL VENOUS BLD VENIPUNCTURE: CPT

## 2021-10-25 PROCEDURE — 84466 ASSAY OF TRANSFERRIN: CPT

## 2021-10-25 PROCEDURE — 99213 OFFICE O/P EST LOW 20 MIN: CPT | Performed by: INTERNAL MEDICINE

## 2021-10-25 PROCEDURE — 82728 ASSAY OF FERRITIN: CPT

## 2021-10-25 PROCEDURE — 85025 COMPLETE CBC W/AUTO DIFF WBC: CPT

## 2021-10-25 PROCEDURE — 85046 RETICYTE/HGB CONCENTRATE: CPT

## 2021-10-25 PROCEDURE — 83540 ASSAY OF IRON: CPT

## 2021-12-15 ENCOUNTER — TELEPHONE (OUTPATIENT)
Dept: ONCOLOGY | Facility: CLINIC | Age: 66
End: 2021-12-15

## 2021-12-15 NOTE — TELEPHONE ENCOUNTER
Caller: Pablito Bergman    Relationship to patient: Self    Best call back number:[phone]    Type of visit: LAB AND INFUSION    Requested date: SAME DAY BUT BEFORE NOON    If rescheduling, when is the original appointment: 12/17    Additional notes: PLEASE CALL ONCE R/S.

## 2021-12-17 ENCOUNTER — LAB (OUTPATIENT)
Dept: LAB | Facility: HOSPITAL | Age: 66
End: 2021-12-17

## 2021-12-17 ENCOUNTER — INFUSION (OUTPATIENT)
Dept: ONCOLOGY | Facility: HOSPITAL | Age: 66
End: 2021-12-17

## 2021-12-17 ENCOUNTER — APPOINTMENT (OUTPATIENT)
Dept: ONCOLOGY | Facility: HOSPITAL | Age: 66
End: 2021-12-17

## 2021-12-17 ENCOUNTER — APPOINTMENT (OUTPATIENT)
Dept: LAB | Facility: HOSPITAL | Age: 66
End: 2021-12-17

## 2021-12-17 VITALS
WEIGHT: 166 LBS | HEART RATE: 62 BPM | RESPIRATION RATE: 16 BRPM | OXYGEN SATURATION: 98 % | TEMPERATURE: 97.5 F | BODY MASS INDEX: 20.86 KG/M2 | DIASTOLIC BLOOD PRESSURE: 93 MMHG | SYSTOLIC BLOOD PRESSURE: 144 MMHG

## 2021-12-17 DIAGNOSIS — E83.110 HEREDITARY HEMOCHROMATOSIS (HCC): Primary | ICD-10-CM

## 2021-12-17 DIAGNOSIS — E83.110 HEREDITARY HEMOCHROMATOSIS (HCC): ICD-10-CM

## 2021-12-17 LAB
BASOPHILS # BLD AUTO: 0.08 10*3/MM3 (ref 0–0.2)
BASOPHILS NFR BLD AUTO: 1.7 % (ref 0–1.5)
DEPRECATED RDW RBC AUTO: 46.6 FL (ref 37–54)
EOSINOPHIL # BLD AUTO: 0.36 10*3/MM3 (ref 0–0.4)
EOSINOPHIL NFR BLD AUTO: 7.7 % (ref 0.3–6.2)
ERYTHROCYTE [DISTWIDTH] IN BLOOD BY AUTOMATED COUNT: 12.8 % (ref 12.3–15.4)
FERRITIN SERPL-MCNC: 62.1 NG/ML (ref 30–400)
HCT VFR BLD AUTO: 52.8 % (ref 37.5–51)
HGB BLD-MCNC: 17 G/DL (ref 13–17.7)
HGB RETIC QN AUTO: 38.3 PG (ref 29.8–36.1)
IMM GRANULOCYTES # BLD AUTO: 0.01 10*3/MM3 (ref 0–0.05)
IMM GRANULOCYTES NFR BLD AUTO: 0.2 % (ref 0–0.5)
IMM RETICS NFR: 5.8 % (ref 3–15.8)
IRON 24H UR-MRATE: 185 MCG/DL (ref 59–158)
IRON SATN MFR SERPL: 61 % (ref 14–48)
LYMPHOCYTES # BLD AUTO: 1.32 10*3/MM3 (ref 0.7–3.1)
LYMPHOCYTES NFR BLD AUTO: 28.2 % (ref 19.6–45.3)
MCH RBC QN AUTO: 31.6 PG (ref 26.6–33)
MCHC RBC AUTO-ENTMCNC: 32.2 G/DL (ref 31.5–35.7)
MCV RBC AUTO: 98.1 FL (ref 79–97)
MONOCYTES # BLD AUTO: 0.5 10*3/MM3 (ref 0.1–0.9)
MONOCYTES NFR BLD AUTO: 10.7 % (ref 5–12)
NEUTROPHILS NFR BLD AUTO: 2.41 10*3/MM3 (ref 1.7–7)
NEUTROPHILS NFR BLD AUTO: 51.5 % (ref 42.7–76)
NRBC BLD AUTO-RTO: 0 /100 WBC (ref 0–0.2)
PLATELET # BLD AUTO: 125 10*3/MM3 (ref 140–450)
PMV BLD AUTO: 10.9 FL (ref 6–12)
RBC # BLD AUTO: 5.38 10*6/MM3 (ref 4.14–5.8)
RETICS # AUTO: 0.05 10*6/MM3 (ref 0.02–0.13)
RETICS/RBC NFR AUTO: 0.88 % (ref 0.7–1.9)
TIBC SERPL-MCNC: 302 MCG/DL (ref 249–505)
TRANSFERRIN SERPL-MCNC: 216 MG/DL (ref 200–360)
WBC NRBC COR # BLD: 4.68 10*3/MM3 (ref 3.4–10.8)

## 2021-12-17 PROCEDURE — 82728 ASSAY OF FERRITIN: CPT

## 2021-12-17 PROCEDURE — 99195 PHLEBOTOMY: CPT

## 2021-12-17 PROCEDURE — 36415 COLL VENOUS BLD VENIPUNCTURE: CPT

## 2021-12-17 PROCEDURE — 83540 ASSAY OF IRON: CPT

## 2021-12-17 PROCEDURE — 85025 COMPLETE CBC W/AUTO DIFF WBC: CPT

## 2021-12-17 PROCEDURE — 84466 ASSAY OF TRANSFERRIN: CPT

## 2021-12-17 PROCEDURE — 85046 RETICYTE/HGB CONCENTRATE: CPT

## 2021-12-17 RX ORDER — SODIUM CHLORIDE 9 MG/ML
250 INJECTION, SOLUTION INTRAVENOUS ONCE
Status: CANCELLED | OUTPATIENT
Start: 2021-12-17

## 2022-02-06 NOTE — PROGRESS NOTES
"[facility] CBC GROUP OUTPATIENT FOLLOW UP CLINIC VISIT    REASON FOR FOLLOW-UP:    1.  Thrombocytopenia  2.  Elevated ferritin with newly diagnosed hereditary hemochromatosis on 8/14/2019.  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  Baseline ferritin in the office on 8/14/2019 is 929, highest 1063 on 10/9/2019.       HISTORY OF PRESENT ILLNESS:  Pablito Bergman is a 67 y.o. male who returns today for follow up of the above issue.      He continues to feel well.  He tolerates phlebotomy very well.  His ferritin overall has recently been much better.  We have spaced out lab checks and phlebotomy to every 2 months.    REVIEW OF SYSTEMS:  As per the [facility]      Vitals:    02/07/22 1035   BP: 128/89   Pulse: 62   Resp: 16   Temp: 97.3 °F (36.3 °C)   TempSrc: Temporal   SpO2: 98%   Weight: 72.2 kg (159 lb 3.2 oz)   Height: 190 cm (74.8\")   PainSc: 0-No pain  Comment: hereditary hemochromatosis       PHYSICAL EXAMINATION:  General:  No acute distress, awake, alert and oriented  Skin:  Warm and dry, no visible rash  HEENT:  Normocephalic/atraumatic.  Wearing a facemask.  Chest:  Normal respiratory effort  Extremities:  No visible clubbing, cyanosis, or edema  Neuro/psych:  Grossly non-focal.  Normal mood and affect.      DIAGNOSTIC DATA:  CBC & Differential (02/07/2022 10:30)  Retic With IRF & RET-He (02/07/2022 10:30)  Iron Profile (02/07/2022 10:30)  Ferritin (02/07/2022 10:30)    IMAGING: None reviewed    ASSESSMENT:  This is a 67 y.o. male with:    *Thrombocytopenia  · This has been mild and chronic.  I did not see any evidence for platelet clumping on his peripheral blood smear.  He has been asymptomatic without bleeding.  His IPF was slightly elevated at 8.7%.  Vitamin B12 was normal at 304.  Continue to monitor.    · Mild thrombocytopenia persists at 127,000 today.  We will continue monitoring.    *Hereditary hemochromatosis:    · He is a homozygote for the HFE gene C282Y mutation.    · Baseline ferritin in " the office on 8/14/2019 is 929.  · Ferritin was slow improvement with phlebotomy about every 2 to 3 weeks.    · AFP normal at 2.17 on 2/18/2021  · With aggressive phlebotomy, ferritin finally improved with a ferritin of 39.0 as of 6/7 and 66.5 as of 7/6.  · Last ferritin on 12/17/2021 was 62.1.  Therapeutic phlebotomy was performed at that time.  · Ferritin today at 65.3.  Phlebotomy indicated.      PLAN:  1. Therapeutic phlebotomy today as his ferritin is above our goal of 50  2. Stat labs with therapeutic phlebotomy if appropriate in about 2 months and I will see him back in about 4 months with the same.  Adjust frequency as appropriate.

## 2022-02-07 ENCOUNTER — OFFICE VISIT (OUTPATIENT)
Dept: ONCOLOGY | Facility: CLINIC | Age: 67
End: 2022-02-07

## 2022-02-07 ENCOUNTER — INFUSION (OUTPATIENT)
Dept: ONCOLOGY | Facility: HOSPITAL | Age: 67
End: 2022-02-07

## 2022-02-07 ENCOUNTER — LAB (OUTPATIENT)
Dept: LAB | Facility: HOSPITAL | Age: 67
End: 2022-02-07

## 2022-02-07 VITALS
TEMPERATURE: 97.3 F | HEART RATE: 62 BPM | OXYGEN SATURATION: 98 % | WEIGHT: 159.2 LBS | BODY MASS INDEX: 19.79 KG/M2 | RESPIRATION RATE: 16 BRPM | SYSTOLIC BLOOD PRESSURE: 128 MMHG | DIASTOLIC BLOOD PRESSURE: 89 MMHG | HEIGHT: 75 IN

## 2022-02-07 DIAGNOSIS — D69.6 THROMBOCYTOPENIA: Primary | ICD-10-CM

## 2022-02-07 DIAGNOSIS — E83.110 HEREDITARY HEMOCHROMATOSIS: Primary | ICD-10-CM

## 2022-02-07 DIAGNOSIS — E83.110 HEREDITARY HEMOCHROMATOSIS: ICD-10-CM

## 2022-02-07 LAB
BASOPHILS # BLD AUTO: 0.07 10*3/MM3 (ref 0–0.2)
BASOPHILS NFR BLD AUTO: 1.7 % (ref 0–1.5)
DEPRECATED RDW RBC AUTO: 46.8 FL (ref 37–54)
EOSINOPHIL # BLD AUTO: 0.23 10*3/MM3 (ref 0–0.4)
EOSINOPHIL NFR BLD AUTO: 5.7 % (ref 0.3–6.2)
ERYTHROCYTE [DISTWIDTH] IN BLOOD BY AUTOMATED COUNT: 12.9 % (ref 12.3–15.4)
FERRITIN SERPL-MCNC: 65.3 NG/ML (ref 30–400)
HCT VFR BLD AUTO: 50.3 % (ref 37.5–51)
HGB BLD-MCNC: 16.2 G/DL (ref 13–17.7)
HGB RETIC QN AUTO: 37.5 PG (ref 29.8–36.1)
IMM GRANULOCYTES # BLD AUTO: 0 10*3/MM3 (ref 0–0.05)
IMM GRANULOCYTES NFR BLD AUTO: 0 % (ref 0–0.5)
IMM RETICS NFR: 2.7 % (ref 3–15.8)
IRON 24H UR-MRATE: 243 MCG/DL (ref 59–158)
IRON SATN MFR SERPL: 81 % (ref 14–48)
LYMPHOCYTES # BLD AUTO: 1.15 10*3/MM3 (ref 0.7–3.1)
LYMPHOCYTES NFR BLD AUTO: 28.6 % (ref 19.6–45.3)
MCH RBC QN AUTO: 31.9 PG (ref 26.6–33)
MCHC RBC AUTO-ENTMCNC: 32.2 G/DL (ref 31.5–35.7)
MCV RBC AUTO: 99 FL (ref 79–97)
MONOCYTES # BLD AUTO: 0.53 10*3/MM3 (ref 0.1–0.9)
MONOCYTES NFR BLD AUTO: 13.2 % (ref 5–12)
NEUTROPHILS NFR BLD AUTO: 2.04 10*3/MM3 (ref 1.7–7)
NEUTROPHILS NFR BLD AUTO: 50.8 % (ref 42.7–76)
NRBC BLD AUTO-RTO: 0 /100 WBC (ref 0–0.2)
PLATELET # BLD AUTO: 127 10*3/MM3 (ref 140–450)
PMV BLD AUTO: 10.9 FL (ref 6–12)
RBC # BLD AUTO: 5.08 10*6/MM3 (ref 4.14–5.8)
RETICS # AUTO: 0.04 10*6/MM3 (ref 0.02–0.13)
RETICS/RBC NFR AUTO: 0.88 % (ref 0.7–1.9)
TIBC SERPL-MCNC: 298 MCG/DL (ref 249–505)
TRANSFERRIN SERPL-MCNC: 213 MG/DL (ref 200–360)
WBC NRBC COR # BLD: 4.02 10*3/MM3 (ref 3.4–10.8)

## 2022-02-07 PROCEDURE — 85025 COMPLETE CBC W/AUTO DIFF WBC: CPT

## 2022-02-07 PROCEDURE — 85046 RETICYTE/HGB CONCENTRATE: CPT

## 2022-02-07 PROCEDURE — 36415 COLL VENOUS BLD VENIPUNCTURE: CPT

## 2022-02-07 PROCEDURE — 99214 OFFICE O/P EST MOD 30 MIN: CPT | Performed by: INTERNAL MEDICINE

## 2022-02-07 PROCEDURE — 84466 ASSAY OF TRANSFERRIN: CPT

## 2022-02-07 PROCEDURE — 82728 ASSAY OF FERRITIN: CPT

## 2022-02-07 PROCEDURE — 99195 PHLEBOTOMY: CPT

## 2022-02-07 PROCEDURE — 83540 ASSAY OF IRON: CPT

## 2022-02-07 RX ORDER — SODIUM CHLORIDE 9 MG/ML
250 INJECTION, SOLUTION INTRAVENOUS ONCE
Status: CANCELLED | OUTPATIENT
Start: 2022-02-07

## 2022-04-18 ENCOUNTER — TELEPHONE (OUTPATIENT)
Dept: ONCOLOGY | Facility: CLINIC | Age: 67
End: 2022-04-18

## 2022-04-18 NOTE — TELEPHONE ENCOUNTER
Caller: Pablito Bergman    Relationship to patient: Self    Best call back number: [phone]    Chief complaint: PATIENT TO RESCHEDULE FROM MISSED APPT 4/11/22    Type of visit: LAB AND INFUSION    Requested date: NEXT AVAILABLE

## 2022-04-20 ENCOUNTER — LAB (OUTPATIENT)
Dept: LAB | Facility: HOSPITAL | Age: 67
End: 2022-04-20

## 2022-04-20 ENCOUNTER — INFUSION (OUTPATIENT)
Dept: ONCOLOGY | Facility: HOSPITAL | Age: 67
End: 2022-04-20

## 2022-04-20 VITALS
TEMPERATURE: 97.7 F | WEIGHT: 161.8 LBS | BODY MASS INDEX: 20.33 KG/M2 | HEART RATE: 73 BPM | DIASTOLIC BLOOD PRESSURE: 78 MMHG | OXYGEN SATURATION: 97 % | RESPIRATION RATE: 16 BRPM | SYSTOLIC BLOOD PRESSURE: 120 MMHG

## 2022-04-20 DIAGNOSIS — D69.6 THROMBOCYTOPENIA: ICD-10-CM

## 2022-04-20 DIAGNOSIS — E83.110 HEREDITARY HEMOCHROMATOSIS: ICD-10-CM

## 2022-04-20 LAB
BASOPHILS # BLD AUTO: 0.07 10*3/MM3 (ref 0–0.2)
BASOPHILS NFR BLD AUTO: 1.5 % (ref 0–1.5)
DEPRECATED RDW RBC AUTO: 47.4 FL (ref 37–54)
EOSINOPHIL # BLD AUTO: 0.28 10*3/MM3 (ref 0–0.4)
EOSINOPHIL NFR BLD AUTO: 6.1 % (ref 0.3–6.2)
ERYTHROCYTE [DISTWIDTH] IN BLOOD BY AUTOMATED COUNT: 13.2 % (ref 12.3–15.4)
FERRITIN SERPL-MCNC: 33.6 NG/ML (ref 30–400)
HCT VFR BLD AUTO: 47.9 % (ref 37.5–51)
HGB BLD-MCNC: 15.9 G/DL (ref 13–17.7)
HGB RETIC QN AUTO: 37.1 PG (ref 29.8–36.1)
IMM GRANULOCYTES # BLD AUTO: 0.01 10*3/MM3 (ref 0–0.05)
IMM GRANULOCYTES NFR BLD AUTO: 0.2 % (ref 0–0.5)
IMM RETICS NFR: 6.3 % (ref 3–15.8)
IRON 24H UR-MRATE: 73 MCG/DL (ref 59–158)
IRON SATN MFR SERPL: 27 % (ref 14–48)
LYMPHOCYTES # BLD AUTO: 1.34 10*3/MM3 (ref 0.7–3.1)
LYMPHOCYTES NFR BLD AUTO: 29.3 % (ref 19.6–45.3)
MCH RBC QN AUTO: 32.1 PG (ref 26.6–33)
MCHC RBC AUTO-ENTMCNC: 33.2 G/DL (ref 31.5–35.7)
MCV RBC AUTO: 96.6 FL (ref 79–97)
MONOCYTES # BLD AUTO: 0.45 10*3/MM3 (ref 0.1–0.9)
MONOCYTES NFR BLD AUTO: 9.8 % (ref 5–12)
NEUTROPHILS NFR BLD AUTO: 2.43 10*3/MM3 (ref 1.7–7)
NEUTROPHILS NFR BLD AUTO: 53.1 % (ref 42.7–76)
NRBC BLD AUTO-RTO: 0 /100 WBC (ref 0–0.2)
PLATELET # BLD AUTO: 118 10*3/MM3 (ref 140–450)
PMV BLD AUTO: 10.9 FL (ref 6–12)
RBC # BLD AUTO: 4.96 10*6/MM3 (ref 4.14–5.8)
RETICS # AUTO: 0.06 10*6/MM3 (ref 0.02–0.13)
RETICS/RBC NFR AUTO: 1.11 % (ref 0.7–1.9)
TIBC SERPL-MCNC: 269 MCG/DL (ref 249–505)
TRANSFERRIN SERPL-MCNC: 192 MG/DL (ref 200–360)
WBC NRBC COR # BLD: 4.58 10*3/MM3 (ref 3.4–10.8)

## 2022-04-20 PROCEDURE — 36415 COLL VENOUS BLD VENIPUNCTURE: CPT

## 2022-04-20 PROCEDURE — G0463 HOSPITAL OUTPT CLINIC VISIT: HCPCS

## 2022-04-20 PROCEDURE — 85025 COMPLETE CBC W/AUTO DIFF WBC: CPT

## 2022-04-20 PROCEDURE — 82728 ASSAY OF FERRITIN: CPT

## 2022-04-20 PROCEDURE — 83540 ASSAY OF IRON: CPT

## 2022-04-20 PROCEDURE — 85046 RETICYTE/HGB CONCENTRATE: CPT

## 2022-04-20 PROCEDURE — 84466 ASSAY OF TRANSFERRIN: CPT

## 2022-04-20 NOTE — NURSING NOTE
No phlebotomy needed today. Ferritin 33.60. Pt scheduled to come back in 2 months to recheck labs and see Dr. Delgado. Pt v/u.

## 2022-06-21 ENCOUNTER — TELEPHONE (OUTPATIENT)
Dept: ONCOLOGY | Facility: CLINIC | Age: 67
End: 2022-06-21

## 2022-06-21 NOTE — TELEPHONE ENCOUNTER
Caller: Pablito Bergman    Relationship: Self    Best call back number: [phone]    What is the best time to reach you: ANYTIME    Who are you requesting to speak with (clinical staff, provider,  specific staff member): SCHEDULING    What was the call regarding: PT NEEDS TO R/S HIS 6/22 APPT.     Do you require a callback: YES

## 2022-06-21 NOTE — TELEPHONE ENCOUNTER
Caller: Pablito Bergman    Relationship: Self        What was the call regarding: CALLED TO CONFIRM APPT CHANGE TO 07/07 8:50AM, LAB F/U AND PHEBO    CONFIRMED CHANGE.

## 2022-06-22 ENCOUNTER — APPOINTMENT (OUTPATIENT)
Dept: ONCOLOGY | Facility: HOSPITAL | Age: 67
End: 2022-06-22

## 2022-06-22 ENCOUNTER — APPOINTMENT (OUTPATIENT)
Dept: LAB | Facility: HOSPITAL | Age: 67
End: 2022-06-22

## 2022-07-04 NOTE — PROGRESS NOTES
"[facility] CBC GROUP OUTPATIENT FOLLOW UP CLINIC VISIT    REASON FOR FOLLOW-UP:    1.  Thrombocytopenia  2.  Elevated ferritin with newly diagnosed hereditary hemochromatosis on 8/14/2019.  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  Baseline ferritin in the office on 8/14/2019 is 929, highest 1063 on 10/9/2019.       HISTORY OF PRESENT ILLNESS:  Pablito Bergman is a 67 y.o. male who returns today for follow up of the above issue.      Last ferritin on 4/20 was 33.6 so he did not require phlebotomy. Last phlebotomy was on Feb 7.    He continues to feel well with no issues today.    REVIEW OF SYSTEMS:  As per the HPI      Vitals:    07/07/22 0907   BP: 117/83   Pulse: 65   Resp: 16   Temp: 97.6 °F (36.4 °C)   TempSrc: Temporal   SpO2: 96%   Weight: 72.2 kg (159 lb 1.6 oz)   Height: 190 cm (74.8\")   PainSc: 0-No pain  Comment: thrombocytopenia       PHYSICAL EXAMINATION:  General:  No acute distress, awake, alert and oriented  Skin:  Warm and dry, no visible rash  HEENT:  Normocephalic/atraumatic.  Wearing a face mask.  Chest:  Normal respiratory effort  Extremities:  No visible clubbing, cyanosis, or edema  Neuro/psych:  Grossly nonfocal.  Normal mood and affect.          DIAGNOSTIC DATA:  CBC & Differential (07/07/2022 09:03)  Retic With IRF & RET-He (07/07/2022 09:03)      IMAGING: None reviewed    ASSESSMENT:  This is a 67 y.o. male with:    *Thrombocytopenia  · This has been mild and chronic.  I did not see any evidence for platelet clumping on his peripheral blood smear.  He has been asymptomatic without bleeding.  His IPF was slightly elevated at 8.7%.  Vitamin B12 was normal at 304.  Continue to monitor.    · Platelets stable at 110,000    *Hereditary hemochromatosis:    · He is a homozygote for the HFE gene C282Y mutation.    · Baseline ferritin in the office on 8/14/2019 is 929.  · Ferritin was slow improvement with phlebotomy about every 2 to 3 weeks.    · AFP normal at 2.17 on 2/18/2021  · With " aggressive phlebotomy, ferritin finally improved with a ferritin of 39.0 as of 6/7 and 66.5 as of 7/6/21.  · Ferritin on 2/7 was above goal of 50 at 65.3.  Phlebotomy performed  · Last ferritin 4/20/22 was at goal of 33.6.  Phlebotomy not required.  · Ferritin and iron studies pending today but I suspect he will require therapeutic phlebotomy.      PLAN:  1. Follow-up pending labs today  2. If his ferritin is greater than 50, therapeutic phlebotomy today and I will plan to see him back in 4 to 5 months with stat labs and an appointment for therapeutic phlebotomy.  If he does not require phlebotomy we may see him back a little bit sooner.

## 2022-07-07 ENCOUNTER — OFFICE VISIT (OUTPATIENT)
Dept: ONCOLOGY | Facility: CLINIC | Age: 67
End: 2022-07-07

## 2022-07-07 ENCOUNTER — LAB (OUTPATIENT)
Dept: LAB | Facility: HOSPITAL | Age: 67
End: 2022-07-07

## 2022-07-07 ENCOUNTER — INFUSION (OUTPATIENT)
Dept: ONCOLOGY | Facility: HOSPITAL | Age: 67
End: 2022-07-07

## 2022-07-07 VITALS
SYSTOLIC BLOOD PRESSURE: 117 MMHG | BODY MASS INDEX: 19.78 KG/M2 | HEIGHT: 75 IN | HEART RATE: 65 BPM | OXYGEN SATURATION: 96 % | WEIGHT: 159.1 LBS | RESPIRATION RATE: 16 BRPM | DIASTOLIC BLOOD PRESSURE: 83 MMHG | TEMPERATURE: 97.6 F

## 2022-07-07 DIAGNOSIS — E83.110 HEREDITARY HEMOCHROMATOSIS: Primary | ICD-10-CM

## 2022-07-07 DIAGNOSIS — D69.6 THROMBOCYTOPENIA: ICD-10-CM

## 2022-07-07 DIAGNOSIS — E83.110 HEREDITARY HEMOCHROMATOSIS: ICD-10-CM

## 2022-07-07 LAB
BASOPHILS # BLD AUTO: 0.07 10*3/MM3 (ref 0–0.2)
BASOPHILS NFR BLD AUTO: 1.6 % (ref 0–1.5)
DEPRECATED RDW RBC AUTO: 48 FL (ref 37–54)
EOSINOPHIL # BLD AUTO: 0.31 10*3/MM3 (ref 0–0.4)
EOSINOPHIL NFR BLD AUTO: 7.2 % (ref 0.3–6.2)
ERYTHROCYTE [DISTWIDTH] IN BLOOD BY AUTOMATED COUNT: 13.2 % (ref 12.3–15.4)
FERRITIN SERPL-MCNC: 91.9 NG/ML (ref 30–400)
HCT VFR BLD AUTO: 46.6 % (ref 37.5–51)
HGB BLD-MCNC: 15.7 G/DL (ref 13–17.7)
HGB RETIC QN AUTO: 38.4 PG (ref 29.8–36.1)
IMM GRANULOCYTES # BLD AUTO: 0 10*3/MM3 (ref 0–0.05)
IMM GRANULOCYTES NFR BLD AUTO: 0 % (ref 0–0.5)
IMM RETICS NFR: 7.7 % (ref 3–15.8)
IRON 24H UR-MRATE: 178 MCG/DL (ref 59–158)
IRON SATN MFR SERPL: 73 % (ref 14–48)
LYMPHOCYTES # BLD AUTO: 1.27 10*3/MM3 (ref 0.7–3.1)
LYMPHOCYTES NFR BLD AUTO: 29.5 % (ref 19.6–45.3)
MCH RBC QN AUTO: 33 PG (ref 26.6–33)
MCHC RBC AUTO-ENTMCNC: 33.7 G/DL (ref 31.5–35.7)
MCV RBC AUTO: 97.9 FL (ref 79–97)
MONOCYTES # BLD AUTO: 0.39 10*3/MM3 (ref 0.1–0.9)
MONOCYTES NFR BLD AUTO: 9 % (ref 5–12)
NEUTROPHILS NFR BLD AUTO: 2.27 10*3/MM3 (ref 1.7–7)
NEUTROPHILS NFR BLD AUTO: 52.7 % (ref 42.7–76)
NRBC BLD AUTO-RTO: 0 /100 WBC (ref 0–0.2)
PLATELET # BLD AUTO: 110 10*3/MM3 (ref 140–450)
PMV BLD AUTO: 11.2 FL (ref 6–12)
RBC # BLD AUTO: 4.76 10*6/MM3 (ref 4.14–5.8)
RETICS # AUTO: 0.05 10*6/MM3 (ref 0.02–0.13)
RETICS/RBC NFR AUTO: 1.13 % (ref 0.7–1.9)
TIBC SERPL-MCNC: 245 MCG/DL (ref 249–505)
TRANSFERRIN SERPL-MCNC: 175 MG/DL (ref 200–360)
WBC NRBC COR # BLD: 4.31 10*3/MM3 (ref 3.4–10.8)

## 2022-07-07 PROCEDURE — 83540 ASSAY OF IRON: CPT

## 2022-07-07 PROCEDURE — 99195 PHLEBOTOMY: CPT

## 2022-07-07 PROCEDURE — 36415 COLL VENOUS BLD VENIPUNCTURE: CPT

## 2022-07-07 PROCEDURE — 85046 RETICYTE/HGB CONCENTRATE: CPT

## 2022-07-07 PROCEDURE — 99213 OFFICE O/P EST LOW 20 MIN: CPT | Performed by: INTERNAL MEDICINE

## 2022-07-07 PROCEDURE — 82728 ASSAY OF FERRITIN: CPT

## 2022-07-07 PROCEDURE — 85025 COMPLETE CBC W/AUTO DIFF WBC: CPT

## 2022-07-07 PROCEDURE — 84466 ASSAY OF TRANSFERRIN: CPT

## 2022-07-07 RX ORDER — COVID-19 MOLECULAR TEST ASSAY
KIT MISCELLANEOUS
COMMUNITY
Start: 2022-04-02 | End: 2022-12-07

## 2022-07-07 RX ORDER — SODIUM CHLORIDE 9 MG/ML
250 INJECTION, SOLUTION INTRAVENOUS ONCE
Status: CANCELLED | OUTPATIENT
Start: 2022-07-07

## 2022-07-29 ENCOUNTER — TRANSCRIBE ORDERS (OUTPATIENT)
Dept: ADMINISTRATIVE | Facility: HOSPITAL | Age: 67
End: 2022-07-29

## 2022-07-29 DIAGNOSIS — I71.21 THORACIC ASCENDING AORTIC ANEURYSM: Primary | ICD-10-CM

## 2022-08-25 ENCOUNTER — HOSPITAL ENCOUNTER (OUTPATIENT)
Dept: CT IMAGING | Facility: HOSPITAL | Age: 67
Discharge: HOME | End: 2022-08-25
Admitting: INTERNAL MEDICINE

## 2022-08-25 DIAGNOSIS — I71.21 THORACIC ASCENDING AORTIC ANEURYSM: ICD-10-CM

## 2022-08-25 PROCEDURE — 71250 CT THORAX DX C-: CPT

## 2022-11-14 ENCOUNTER — TELEPHONE (OUTPATIENT)
Dept: ONCOLOGY | Facility: CLINIC | Age: 67
End: 2022-11-14

## 2022-11-14 NOTE — TELEPHONE ENCOUNTER
Caller: Pablito Bergman    Relationship to patient: Self    Best call back number: [phone]    Chief complaint:PATIENT TO RESCHEDULE 11/29/22 APPT     Type of visit: LAB FU AND INFUSION    Requested date: AFTER 12/3/22

## 2022-12-06 RX ORDER — SODIUM CHLORIDE 9 MG/ML
250 INJECTION, SOLUTION INTRAVENOUS ONCE
Status: CANCELLED | OUTPATIENT
Start: 2022-12-06

## 2022-12-06 NOTE — PROGRESS NOTES
"[facility] CBC GROUP OUTPATIENT FOLLOW UP CLINIC VISIT    REASON FOR FOLLOW-UP:    1.  Thrombocytopenia  2.  Elevated ferritin with newly diagnosed hereditary hemochromatosis on 8/14/2019.  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  Baseline ferritin in the office on 8/14/2019 is 929, highest 1063 on 10/9/2019.       HISTORY OF PRESENT ILLNESS:  Pablito Bergman is a 67 y.o. male who returns today for follow up of the above issue.      Last phlebotomy on 7/7/2022 with a ferritin of 91.9.    He continues to feel very well.  No new complaints today.  No arthralgias.      REVIEW OF SYSTEMS:  As per the HPI      Vitals:    12/07/22 0928   BP: 133/90   Pulse: 70   Resp: 18   Temp: 98.4 °F (36.9 °C)   TempSrc: Temporal   SpO2: 97%   Weight: 73.9 kg (162 lb 14.4 oz)   Height: 190 cm (74.8\")   PainSc: 0-No pain       PHYSICAL EXAMINATION:  General:  No acute distress, awake, alert and oriented  Skin:  Warm and dry, no visible rash  HEENT:  Normocephalic/atraumatic.  Wearing a face mask.  Chest:  Normal respiratory effort  Extremities:  No visible clubbing, cyanosis, or edema  Neuro/psych:  Grossly nonfocal.  Normal mood and affect.          DIAGNOSTIC DATA:  Iron Profile (12/07/2022 09:25)  Ferritin (12/07/2022 09:25)  CBC & Differential (12/07/2022 09:25)  Retic With IRF & RET-He (12/07/2022 09:25)      IMAGING:  None reviewed    ASSESSMENT:  This is a 67 y.o. male with:    *Thrombocytopenia  · This has been mild and chronic.  I did not see any evidence for platelet clumping on his peripheral blood smear.  He has been asymptomatic without bleeding.  His IPF was slightly elevated at 8.7%.  Vitamin B12 was normal at 304.  Continue to monitor.    · Platelets improved at 136,000    *Hereditary hemochromatosis:    · He is a homozygote for the HFE gene C282Y mutation.    · Baseline ferritin in the office on 8/14/2019 is 929.  · Ferritin was slow improvement with phlebotomy about every 2 to 3 weeks.    · AFP normal at " 2.17 on 2/18/2021  · With aggressive phlebotomy, ferritin finally improved with a ferritin of 39.0 as of 6/7 and 66.5 as of 7/6/21.  · Ferritin on 2/7 was above goal of 50 at 65.3.  Phlebotomy performed  · Last ferritin 4/20/22 was at goal of 33.6.  Phlebotomy not required.  · 7/7/2022: Ferritin was 91.9 and he did receive therapeutic phlebotomy.  · 12/7/2022: Ferritin 109.7      PLAN:  1. He will have a therapeutic phlebotomy today with a ferritin of 109.  2. Follow-up with me in about 5 months with stat labs and an appointment for therapeutic phlebotomy if needed for ferritin of greater than 50.

## 2022-12-07 ENCOUNTER — INFUSION (OUTPATIENT)
Dept: ONCOLOGY | Facility: HOSPITAL | Age: 67
End: 2022-12-07

## 2022-12-07 ENCOUNTER — OFFICE VISIT (OUTPATIENT)
Dept: ONCOLOGY | Facility: CLINIC | Age: 67
End: 2022-12-07

## 2022-12-07 ENCOUNTER — LAB (OUTPATIENT)
Dept: LAB | Facility: HOSPITAL | Age: 67
End: 2022-12-07

## 2022-12-07 VITALS
DIASTOLIC BLOOD PRESSURE: 90 MMHG | HEIGHT: 75 IN | WEIGHT: 162.9 LBS | SYSTOLIC BLOOD PRESSURE: 133 MMHG | HEART RATE: 70 BPM | OXYGEN SATURATION: 97 % | TEMPERATURE: 98.4 F | BODY MASS INDEX: 20.25 KG/M2 | RESPIRATION RATE: 18 BRPM

## 2022-12-07 DIAGNOSIS — E83.110 HEREDITARY HEMOCHROMATOSIS: ICD-10-CM

## 2022-12-07 DIAGNOSIS — E83.110 HEREDITARY HEMOCHROMATOSIS: Primary | ICD-10-CM

## 2022-12-07 LAB
BASOPHILS # BLD AUTO: 0.09 10*3/MM3 (ref 0–0.2)
BASOPHILS NFR BLD AUTO: 1.9 % (ref 0–1.5)
DEPRECATED RDW RBC AUTO: 46.5 FL (ref 37–54)
EOSINOPHIL # BLD AUTO: 0.39 10*3/MM3 (ref 0–0.4)
EOSINOPHIL NFR BLD AUTO: 8.3 % (ref 0.3–6.2)
ERYTHROCYTE [DISTWIDTH] IN BLOOD BY AUTOMATED COUNT: 12.7 % (ref 12.3–15.4)
FERRITIN SERPL-MCNC: 109.7 NG/ML (ref 30–400)
HCT VFR BLD AUTO: 49.4 % (ref 37.5–51)
HGB BLD-MCNC: 16.5 G/DL (ref 13–17.7)
HGB RETIC QN AUTO: 37.5 PG (ref 29.8–36.1)
IMM GRANULOCYTES # BLD AUTO: 0.01 10*3/MM3 (ref 0–0.05)
IMM GRANULOCYTES NFR BLD AUTO: 0.2 % (ref 0–0.5)
IMM RETICS NFR: 6.7 % (ref 3–15.8)
IRON 24H UR-MRATE: 238 MCG/DL (ref 59–158)
IRON SATN MFR SERPL: 86 % (ref 14–48)
LYMPHOCYTES # BLD AUTO: 1.41 10*3/MM3 (ref 0.7–3.1)
LYMPHOCYTES NFR BLD AUTO: 30.1 % (ref 19.6–45.3)
MCH RBC QN AUTO: 33.2 PG (ref 26.6–33)
MCHC RBC AUTO-ENTMCNC: 33.4 G/DL (ref 31.5–35.7)
MCV RBC AUTO: 99.4 FL (ref 79–97)
MONOCYTES # BLD AUTO: 0.5 10*3/MM3 (ref 0.1–0.9)
MONOCYTES NFR BLD AUTO: 10.7 % (ref 5–12)
NEUTROPHILS NFR BLD AUTO: 2.29 10*3/MM3 (ref 1.7–7)
NEUTROPHILS NFR BLD AUTO: 48.8 % (ref 42.7–76)
NRBC BLD AUTO-RTO: 0 /100 WBC (ref 0–0.2)
PLATELET # BLD AUTO: 136 10*3/MM3 (ref 140–450)
PMV BLD AUTO: 10.6 FL (ref 6–12)
RBC # BLD AUTO: 4.97 10*6/MM3 (ref 4.14–5.8)
RETICS # AUTO: 0.05 10*6/MM3 (ref 0.02–0.13)
RETICS/RBC NFR AUTO: 1.06 % (ref 0.7–1.9)
TIBC SERPL-MCNC: 277 MCG/DL (ref 249–505)
TRANSFERRIN SERPL-MCNC: 198 MG/DL (ref 200–360)
WBC NRBC COR # BLD: 4.69 10*3/MM3 (ref 3.4–10.8)

## 2022-12-07 PROCEDURE — 85046 RETICYTE/HGB CONCENTRATE: CPT | Performed by: INTERNAL MEDICINE

## 2022-12-07 PROCEDURE — 85025 COMPLETE CBC W/AUTO DIFF WBC: CPT | Performed by: INTERNAL MEDICINE

## 2022-12-07 PROCEDURE — 36415 COLL VENOUS BLD VENIPUNCTURE: CPT | Performed by: INTERNAL MEDICINE

## 2022-12-07 PROCEDURE — 84466 ASSAY OF TRANSFERRIN: CPT | Performed by: INTERNAL MEDICINE

## 2022-12-07 PROCEDURE — 82728 ASSAY OF FERRITIN: CPT | Performed by: INTERNAL MEDICINE

## 2022-12-07 PROCEDURE — 99213 OFFICE O/P EST LOW 20 MIN: CPT | Performed by: INTERNAL MEDICINE

## 2022-12-07 PROCEDURE — 99195 PHLEBOTOMY: CPT

## 2022-12-07 PROCEDURE — 83540 ASSAY OF IRON: CPT | Performed by: INTERNAL MEDICINE

## 2022-12-07 RX ORDER — SODIUM CHLORIDE 9 MG/ML
250 INJECTION, SOLUTION INTRAVENOUS ONCE
OUTPATIENT
Start: 2022-12-07

## 2023-05-09 NOTE — PROGRESS NOTES
"[facility] CBC GROUP OUTPATIENT FOLLOW UP CLINIC VISIT    REASON FOR FOLLOW-UP:    1.  Thrombocytopenia  2.  Elevated ferritin with newly diagnosed hereditary hemochromatosis on 8/14/2019.  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  Baseline ferritin in the office on 8/14/2019 is 929, highest 1063 on 10/9/2019.       HISTORY OF PRESENT ILLNESS:  Pablito Bergman is a 68 y.o. male who returns today for follow up of the above issue.      Last phlebotomy on 12/7/2022 with a ferritin of 109.    He continues to feel well.  He has no new complaints today.  He continues to travel.    REVIEW OF SYSTEMS:  As per the [facility]      Vitals:    05/10/23 1050   BP: 143/93   Pulse: 66   Resp: 18   Temp: 97.8 °F (36.6 °C)   TempSrc: Temporal   SpO2: 96%   Weight: 75 kg (165 lb 4.8 oz)   Height: 190 cm (74.8\")   PainSc: 0-No pain       PHYSICAL EXAMINATION:  General:  No acute distress, awake, alert and oriented  Skin:  Warm and dry, no visible rash  HEENT:  Normocephalic/atraumatic.   Chest:  Normal respiratory effort.  Lungs clear to auscultation bilaterally.  Heart: Regular rate and rhythm  Extremities:  No visible clubbing, cyanosis, or edema  Neuro/psych:  Grossly nonfocal.  Normal mood and affect.    DIAGNOSTIC DATA:  CBC & Differential (05/10/2023 10:39)  Retic With IRF & RET-He (05/10/2023 10:39)  Iron Profile (05/10/2023 10:39)  Ferritin (05/10/2023 10:39)      IMAGING:  None reviewed    ASSESSMENT:  This is a 68 y.o. male with:    *Thrombocytopenia  · This has been mild and chronic.  I did not see any evidence for platelet clumping on his peripheral blood smear.  He has been asymptomatic without bleeding.  His IPF was slightly elevated at 8.7%.  Vitamin B12 was normal at 304.  Continue to monitor.    · Platelets 121,000    *Hereditary hemochromatosis:    · He is a homozygote for the HFE gene C282Y mutation.    · Baseline ferritin in the office on 8/14/2019 is 929.  · Ferritin was slow improvement with phlebotomy about " every 2 to 3 weeks.    · AFP normal at 2.17 on 2/18/2021  · With aggressive phlebotomy, ferritin finally improved with a ferritin of 39.0 as of 6/7 and 66.5 as of 7/6/21.  · Ferritin on 2/7 was above goal of 50 at 65.3.  Phlebotomy performed  · Last ferritin 4/20/22 was at goal of 33.6.  Phlebotomy not required.  · 7/7/2022: Ferritin was 91.9 and he did receive therapeutic phlebotomy.  · 12/7/2022: Ferritin 109.7.  Phlebotomy performed.  · 5/10/2023: Ferritin 114      PLAN:  1. He will have therapeutic phlebotomy today for ferritin of 114  2. Follow-up in 6 months with stat labs and an appointment for therapeutic phlebotomy if appropriate for a ferritin of greater than 50

## 2023-05-10 ENCOUNTER — OFFICE VISIT (OUTPATIENT)
Dept: ONCOLOGY | Facility: CLINIC | Age: 68
End: 2023-05-10
Payer: COMMERCIAL

## 2023-05-10 ENCOUNTER — LAB (OUTPATIENT)
Dept: LAB | Facility: HOSPITAL | Age: 68
End: 2023-05-10
Payer: COMMERCIAL

## 2023-05-10 ENCOUNTER — INFUSION (OUTPATIENT)
Dept: ONCOLOGY | Facility: HOSPITAL | Age: 68
End: 2023-05-10
Payer: COMMERCIAL

## 2023-05-10 VITALS
HEIGHT: 75 IN | RESPIRATION RATE: 18 BRPM | TEMPERATURE: 97.8 F | DIASTOLIC BLOOD PRESSURE: 93 MMHG | HEART RATE: 66 BPM | OXYGEN SATURATION: 96 % | WEIGHT: 165.3 LBS | SYSTOLIC BLOOD PRESSURE: 143 MMHG | BODY MASS INDEX: 20.55 KG/M2

## 2023-05-10 DIAGNOSIS — E83.110 HEREDITARY HEMOCHROMATOSIS: Primary | ICD-10-CM

## 2023-05-10 DIAGNOSIS — E83.110 HEREDITARY HEMOCHROMATOSIS: ICD-10-CM

## 2023-05-10 DIAGNOSIS — D69.6 THROMBOCYTOPENIA: ICD-10-CM

## 2023-05-10 LAB
BASOPHILS # BLD AUTO: 0.07 10*3/MM3 (ref 0–0.2)
BASOPHILS NFR BLD AUTO: 1.4 % (ref 0–1.5)
DEPRECATED RDW RBC AUTO: 47.8 FL (ref 37–54)
EOSINOPHIL # BLD AUTO: 0.37 10*3/MM3 (ref 0–0.4)
EOSINOPHIL NFR BLD AUTO: 7.6 % (ref 0.3–6.2)
ERYTHROCYTE [DISTWIDTH] IN BLOOD BY AUTOMATED COUNT: 12.9 % (ref 12.3–15.4)
FERRITIN SERPL-MCNC: 114.1 NG/ML (ref 30–400)
HCT VFR BLD AUTO: 48.1 % (ref 37.5–51)
HGB BLD-MCNC: 15.8 G/DL (ref 13–17.7)
HGB RETIC QN AUTO: 36.6 PG (ref 29.8–36.1)
IMM GRANULOCYTES # BLD AUTO: 0.02 10*3/MM3 (ref 0–0.05)
IMM GRANULOCYTES NFR BLD AUTO: 0.4 % (ref 0–0.5)
IMM RETICS NFR: 6.1 % (ref 3–15.8)
IRON 24H UR-MRATE: 218 MCG/DL (ref 59–158)
IRON SATN MFR SERPL: 82 % (ref 14–48)
LYMPHOCYTES # BLD AUTO: 1.37 10*3/MM3 (ref 0.7–3.1)
LYMPHOCYTES NFR BLD AUTO: 28.1 % (ref 19.6–45.3)
MCH RBC QN AUTO: 32.9 PG (ref 26.6–33)
MCHC RBC AUTO-ENTMCNC: 32.8 G/DL (ref 31.5–35.7)
MCV RBC AUTO: 100.2 FL (ref 79–97)
MONOCYTES # BLD AUTO: 0.54 10*3/MM3 (ref 0.1–0.9)
MONOCYTES NFR BLD AUTO: 11.1 % (ref 5–12)
NEUTROPHILS NFR BLD AUTO: 2.51 10*3/MM3 (ref 1.7–7)
NEUTROPHILS NFR BLD AUTO: 51.4 % (ref 42.7–76)
NRBC BLD AUTO-RTO: 0 /100 WBC (ref 0–0.2)
PLATELET # BLD AUTO: 121 10*3/MM3 (ref 140–450)
PMV BLD AUTO: 10.3 FL (ref 6–12)
RBC # BLD AUTO: 4.8 10*6/MM3 (ref 4.14–5.8)
RETICS # AUTO: 0.06 10*6/MM3 (ref 0.02–0.13)
RETICS/RBC NFR AUTO: 1.22 % (ref 0.7–1.9)
TIBC SERPL-MCNC: 265 MCG/DL (ref 249–505)
TRANSFERRIN SERPL-MCNC: 189 MG/DL (ref 200–360)
WBC NRBC COR # BLD: 4.88 10*3/MM3 (ref 3.4–10.8)

## 2023-05-10 PROCEDURE — 82728 ASSAY OF FERRITIN: CPT

## 2023-05-10 PROCEDURE — 83540 ASSAY OF IRON: CPT

## 2023-05-10 PROCEDURE — 99195 PHLEBOTOMY: CPT

## 2023-05-10 PROCEDURE — 84466 ASSAY OF TRANSFERRIN: CPT

## 2023-05-10 PROCEDURE — 85025 COMPLETE CBC W/AUTO DIFF WBC: CPT

## 2023-05-10 PROCEDURE — 99214 OFFICE O/P EST MOD 30 MIN: CPT | Performed by: INTERNAL MEDICINE

## 2023-05-10 PROCEDURE — 36415 COLL VENOUS BLD VENIPUNCTURE: CPT

## 2023-05-10 PROCEDURE — 85046 RETICYTE/HGB CONCENTRATE: CPT

## 2023-05-10 RX ORDER — SODIUM CHLORIDE 9 MG/ML
250 INJECTION, SOLUTION INTRAVENOUS ONCE
OUTPATIENT
Start: 2023-05-10

## 2023-08-15 ENCOUNTER — TRANSCRIBE ORDERS (OUTPATIENT)
Dept: LAB | Facility: HOSPITAL | Age: 68
End: 2023-08-15
Payer: COMMERCIAL

## 2023-08-15 ENCOUNTER — TRANSCRIBE ORDERS (OUTPATIENT)
Dept: CT IMAGING | Facility: HOSPITAL | Age: 68
End: 2023-08-15
Payer: COMMERCIAL

## 2023-08-15 ENCOUNTER — LAB (OUTPATIENT)
Dept: LAB | Facility: HOSPITAL | Age: 68
End: 2023-08-15
Payer: COMMERCIAL

## 2023-08-15 DIAGNOSIS — I10 ESSENTIAL HYPERTENSION, MALIGNANT: ICD-10-CM

## 2023-08-15 DIAGNOSIS — I25.10 DISEASE OF CARDIOVASCULAR SYSTEM: ICD-10-CM

## 2023-08-15 DIAGNOSIS — D69.6 THROMBOCYTOPENIA, UNSPECIFIED: ICD-10-CM

## 2023-08-15 DIAGNOSIS — I10 ESSENTIAL HYPERTENSION, MALIGNANT: Primary | ICD-10-CM

## 2023-08-15 DIAGNOSIS — I71.21 ASCENDING AORTIC ANEURYSM, UNSPECIFIED WHETHER RUPTURED: Primary | ICD-10-CM

## 2023-08-15 DIAGNOSIS — Z12.5 SPECIAL SCREENING FOR MALIGNANT NEOPLASM OF PROSTATE: ICD-10-CM

## 2023-08-15 LAB
ALBUMIN SERPL-MCNC: 5 G/DL (ref 3.5–5.2)
ALBUMIN/GLOB SERPL: 2.6 G/DL
ALP SERPL-CCNC: 87 U/L (ref 39–117)
ALT SERPL W P-5'-P-CCNC: 17 U/L (ref 1–41)
ANION GAP SERPL CALCULATED.3IONS-SCNC: 9 MMOL/L (ref 5–15)
AST SERPL-CCNC: 23 U/L (ref 1–40)
BASOPHILS # BLD AUTO: 0.09 10*3/MM3 (ref 0–0.2)
BASOPHILS NFR BLD AUTO: 2 % (ref 0–1.5)
BILIRUB SERPL-MCNC: 0.7 MG/DL (ref 0–1.2)
BILIRUB UR QL STRIP: NEGATIVE
BUN SERPL-MCNC: 20 MG/DL (ref 8–23)
BUN/CREAT SERPL: 22.2 (ref 7–25)
CALCIUM SPEC-SCNC: 9.6 MG/DL (ref 8.6–10.5)
CHLORIDE SERPL-SCNC: 103 MMOL/L (ref 98–107)
CHOLEST SERPL-MCNC: 139 MG/DL (ref 0–200)
CLARITY UR: CLEAR
CO2 SERPL-SCNC: 29 MMOL/L (ref 22–29)
COLOR UR: YELLOW
CREAT SERPL-MCNC: 0.9 MG/DL (ref 0.76–1.27)
DEPRECATED RDW RBC AUTO: 44.9 FL (ref 37–54)
EGFRCR SERPLBLD CKD-EPI 2021: 92.5 ML/MIN/1.73
EOSINOPHIL # BLD AUTO: 0.34 10*3/MM3 (ref 0–0.4)
EOSINOPHIL NFR BLD AUTO: 7.5 % (ref 0.3–6.2)
ERYTHROCYTE [DISTWIDTH] IN BLOOD BY AUTOMATED COUNT: 12 % (ref 12.3–15.4)
GLOBULIN UR ELPH-MCNC: 1.9 GM/DL
GLUCOSE SERPL-MCNC: 114 MG/DL (ref 65–99)
GLUCOSE UR STRIP-MCNC: NEGATIVE MG/DL
HCT VFR BLD AUTO: 51.1 % (ref 37.5–51)
HDLC SERPL-MCNC: 48 MG/DL (ref 40–60)
HGB BLD-MCNC: 17.1 G/DL (ref 13–17.7)
HGB UR QL STRIP.AUTO: NEGATIVE
KETONES UR QL STRIP: NEGATIVE
LDLC SERPL CALC-MCNC: 77 MG/DL (ref 0–100)
LDLC/HDLC SERPL: 1.6 {RATIO}
LEUKOCYTE ESTERASE UR QL STRIP.AUTO: NEGATIVE
LYMPHOCYTES # BLD AUTO: 1.18 10*3/MM3 (ref 0.7–3.1)
LYMPHOCYTES NFR BLD AUTO: 25.9 % (ref 19.6–45.3)
MCH RBC QN AUTO: 33.3 PG (ref 26.6–33)
MCHC RBC AUTO-ENTMCNC: 33.5 G/DL (ref 31.5–35.7)
MCV RBC AUTO: 99.4 FL (ref 79–97)
MONOCYTES # BLD AUTO: 0.45 10*3/MM3 (ref 0.1–0.9)
MONOCYTES NFR BLD AUTO: 9.9 % (ref 5–12)
NEUTROPHILS NFR BLD AUTO: 2.47 10*3/MM3 (ref 1.7–7)
NEUTROPHILS NFR BLD AUTO: 54.3 % (ref 42.7–76)
NITRITE UR QL STRIP: NEGATIVE
PH UR STRIP.AUTO: 5.5 [PH] (ref 5–8)
PLATELET # BLD AUTO: 120 10*3/MM3 (ref 140–450)
PMV BLD AUTO: 10.6 FL (ref 6–12)
POTASSIUM SERPL-SCNC: 4.4 MMOL/L (ref 3.5–5.2)
PROT SERPL-MCNC: 6.9 G/DL (ref 6–8.5)
PROT UR QL STRIP: NEGATIVE
PSA SERPL-MCNC: 0.8 NG/ML (ref 0–4)
RBC # BLD AUTO: 5.14 10*6/MM3 (ref 4.14–5.8)
SODIUM SERPL-SCNC: 141 MMOL/L (ref 136–145)
SP GR UR STRIP: 1.02 (ref 1–1.03)
TRIGL SERPL-MCNC: 72 MG/DL (ref 0–150)
UROBILINOGEN UR QL STRIP: NORMAL
VLDLC SERPL-MCNC: 14 MG/DL (ref 5–40)
WBC NRBC COR # BLD: 4.55 10*3/MM3 (ref 3.4–10.8)

## 2023-08-15 PROCEDURE — 36415 COLL VENOUS BLD VENIPUNCTURE: CPT

## 2023-08-15 PROCEDURE — 85025 COMPLETE CBC W/AUTO DIFF WBC: CPT

## 2023-08-15 PROCEDURE — 80053 COMPREHEN METABOLIC PANEL: CPT

## 2023-08-15 PROCEDURE — 81003 URINALYSIS AUTO W/O SCOPE: CPT

## 2023-08-15 PROCEDURE — 80061 LIPID PANEL: CPT

## 2023-08-15 PROCEDURE — G0103 PSA SCREENING: HCPCS

## 2023-09-21 ENCOUNTER — TRANSCRIBE ORDERS (OUTPATIENT)
Dept: LAB | Facility: HOSPITAL | Age: 68
End: 2023-09-21

## 2023-09-21 ENCOUNTER — LAB (OUTPATIENT)
Dept: LAB | Facility: HOSPITAL | Age: 68
End: 2023-09-21

## 2023-09-21 DIAGNOSIS — I10 ESSENTIAL HYPERTENSION, MALIGNANT: Primary | ICD-10-CM

## 2023-09-21 DIAGNOSIS — R73.01 ELEVATED FASTING GLUCOSE: ICD-10-CM

## 2023-09-21 DIAGNOSIS — D75.1 ERYTHROCYTOSIS: ICD-10-CM

## 2023-09-21 DIAGNOSIS — I10 ESSENTIAL HYPERTENSION, MALIGNANT: ICD-10-CM

## 2023-09-21 LAB
ANION GAP SERPL CALCULATED.3IONS-SCNC: 9 MMOL/L (ref 5–15)
BASOPHILS # BLD AUTO: 0.07 10*3/MM3 (ref 0–0.2)
BASOPHILS NFR BLD AUTO: 1.2 % (ref 0–1.5)
BUN SERPL-MCNC: 24 MG/DL (ref 8–23)
BUN/CREAT SERPL: 23.8 (ref 7–25)
CALCIUM SPEC-SCNC: 9.3 MG/DL (ref 8.6–10.5)
CHLORIDE SERPL-SCNC: 107 MMOL/L (ref 98–107)
CO2 SERPL-SCNC: 26 MMOL/L (ref 22–29)
CREAT SERPL-MCNC: 1.01 MG/DL (ref 0.76–1.27)
DEPRECATED RDW RBC AUTO: 42.8 FL (ref 37–54)
EGFRCR SERPLBLD CKD-EPI 2021: 80.5 ML/MIN/1.73
EOSINOPHIL # BLD AUTO: 0.24 10*3/MM3 (ref 0–0.4)
EOSINOPHIL NFR BLD AUTO: 4.3 % (ref 0.3–6.2)
ERYTHROCYTE [DISTWIDTH] IN BLOOD BY AUTOMATED COUNT: 11.9 % (ref 12.3–15.4)
GLUCOSE SERPL-MCNC: 94 MG/DL (ref 65–99)
HBA1C MFR BLD: 5.6 % (ref 4.8–5.6)
HCT VFR BLD AUTO: 45.8 % (ref 37.5–51)
HGB BLD-MCNC: 15.3 G/DL (ref 13–17.7)
LYMPHOCYTES # BLD AUTO: 1.29 10*3/MM3 (ref 0.7–3.1)
LYMPHOCYTES NFR BLD AUTO: 23 % (ref 19.6–45.3)
MCH RBC QN AUTO: 32.3 PG (ref 26.6–33)
MCHC RBC AUTO-ENTMCNC: 33.4 G/DL (ref 31.5–35.7)
MCV RBC AUTO: 96.6 FL (ref 79–97)
MONOCYTES # BLD AUTO: 0.48 10*3/MM3 (ref 0.1–0.9)
MONOCYTES NFR BLD AUTO: 8.6 % (ref 5–12)
NEUTROPHILS NFR BLD AUTO: 3.52 10*3/MM3 (ref 1.7–7)
NEUTROPHILS NFR BLD AUTO: 62.7 % (ref 42.7–76)
PLATELET # BLD AUTO: 131 10*3/MM3 (ref 140–450)
PMV BLD AUTO: 11 FL (ref 6–12)
POTASSIUM SERPL-SCNC: 4.8 MMOL/L (ref 3.5–5.2)
RBC # BLD AUTO: 4.74 10*6/MM3 (ref 4.14–5.8)
SODIUM SERPL-SCNC: 142 MMOL/L (ref 136–145)
WBC NRBC COR # BLD: 5.61 10*3/MM3 (ref 3.4–10.8)

## 2023-09-21 PROCEDURE — 36415 COLL VENOUS BLD VENIPUNCTURE: CPT

## 2023-09-21 PROCEDURE — 83036 HEMOGLOBIN GLYCOSYLATED A1C: CPT

## 2023-09-21 PROCEDURE — 85025 COMPLETE CBC W/AUTO DIFF WBC: CPT

## 2023-09-21 PROCEDURE — 80048 BASIC METABOLIC PNL TOTAL CA: CPT

## 2023-09-22 ENCOUNTER — HOSPITAL ENCOUNTER (OUTPATIENT)
Dept: CT IMAGING | Facility: HOSPITAL | Age: 68
Discharge: HOME | End: 2023-09-22
Admitting: INTERNAL MEDICINE
Payer: COMMERCIAL

## 2023-09-22 DIAGNOSIS — I71.21 ASCENDING AORTIC ANEURYSM, UNSPECIFIED WHETHER RUPTURED: ICD-10-CM

## 2023-09-22 PROCEDURE — 71250 CT THORAX DX C-: CPT

## 2023-09-29 ENCOUNTER — TRANSCRIBE ORDERS (OUTPATIENT)
Dept: ADMINISTRATIVE | Facility: HOSPITAL | Age: 68
End: 2023-09-29
Payer: COMMERCIAL

## 2023-09-29 DIAGNOSIS — R91.1 PULMONARY NODULE: Primary | ICD-10-CM

## 2023-11-07 NOTE — PROGRESS NOTES
"[facility] CBC GROUP OUTPATIENT FOLLOW UP CLINIC VISIT    REASON FOR FOLLOW-UP:    1.  Thrombocytopenia  2.  Elevated ferritin with newly diagnosed hereditary hemochromatosis on 8/14/2019.  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  Baseline ferritin in the office on 8/14/2019 is 929, highest 1063 on 10/9/2019.       HISTORY OF PRESENT ILLNESS:  Pablito Bergman is a 68 y.o. male who returns today for follow up of the above issue.      Last phlebotomy on 5/10/2023 with a ferritin of 114.    He continues to feel very well with no new issues today.      REVIEW OF SYSTEMS:  As per the HPI      Vitals:    11/08/23 1056   BP: 128/87   Pulse: 57   Resp: 18   Temp: 97.7 °F (36.5 °C)   TempSrc: Temporal   SpO2: 96%   Weight: 72.8 kg (160 lb 6.4 oz)   Height: 190 cm (74.8\")   PainSc: 0-No pain         PHYSICAL EXAMINATION:  General:  No acute distress, awake, alert and oriented  Skin:  Warm and dry, no visible rash  HEENT:  Normocephalic/atraumatic.  Chest:  Normal respiratory effort  Extremities:  No visible clubbing, cyanosis, or edema  Neuro/psych:  Grossly nonfocal.  Normal mood and affect.     DIAGNOSTIC DATA:  CBC & Differential (11/08/2023 10:44)   Ferritin (11/08/2023 10:44)     IMAGING:  None reviewed    ASSESSMENT:  This is a 68 y.o. male with:    *Thrombocytopenia  This has been mild and chronic.  I did not see any evidence for platelet clumping on his peripheral blood smear.  He has been asymptomatic without bleeding.  His IPF was slightly elevated at 8.7%.  Vitamin B12 was normal at 304.  Continue to monitor.    11/8/2023: Platelets 119,000    *Hereditary hemochromatosis:    He is a homozygote for the HFE gene C282Y mutation.    Baseline ferritin in the office on 8/14/2019 is 929.  Ferritin slowly improved with phlebotomy about every 2 to 3 weeks.    AFP normal at 2.17 on 2/18/2021  With aggressive phlebotomy, ferritin finally improved with a ferritin of 39.0 as of 6/7 and 66.5 as of 7/6/21.  11/8/2023: " Ferritin 160, from 114, from 109, from 91      PLAN:  He will have therapeutic phlebotomy today for ferritin of 160  Because his ferritin continues to increase and is above our goal, I think he needs therapeutic phlebotomy more frequently than every 6 months at this point.  Therefore, follow-up in 3 months with stat labs and an appointment for therapeutic phlebotomy if appropriate for a ferritin of greater than 50

## 2023-11-08 ENCOUNTER — INFUSION (OUTPATIENT)
Dept: ONCOLOGY | Facility: HOSPITAL | Age: 68
End: 2023-11-08
Payer: COMMERCIAL

## 2023-11-08 ENCOUNTER — OFFICE VISIT (OUTPATIENT)
Dept: ONCOLOGY | Facility: CLINIC | Age: 68
End: 2023-11-08
Payer: COMMERCIAL

## 2023-11-08 ENCOUNTER — LAB (OUTPATIENT)
Dept: LAB | Facility: HOSPITAL | Age: 68
End: 2023-11-08
Payer: COMMERCIAL

## 2023-11-08 VITALS
WEIGHT: 160.4 LBS | BODY MASS INDEX: 19.94 KG/M2 | TEMPERATURE: 97.7 F | HEIGHT: 75 IN | SYSTOLIC BLOOD PRESSURE: 128 MMHG | RESPIRATION RATE: 18 BRPM | DIASTOLIC BLOOD PRESSURE: 87 MMHG | HEART RATE: 57 BPM | OXYGEN SATURATION: 96 %

## 2023-11-08 DIAGNOSIS — E83.110 HEREDITARY HEMOCHROMATOSIS: Primary | ICD-10-CM

## 2023-11-08 DIAGNOSIS — E83.110 HEREDITARY HEMOCHROMATOSIS: ICD-10-CM

## 2023-11-08 LAB
BASOPHILS # BLD AUTO: 0.06 10*3/MM3 (ref 0–0.2)
BASOPHILS NFR BLD AUTO: 1.5 % (ref 0–1.5)
DEPRECATED RDW RBC AUTO: 47.8 FL (ref 37–54)
EOSINOPHIL # BLD AUTO: 0.25 10*3/MM3 (ref 0–0.4)
EOSINOPHIL NFR BLD AUTO: 6.1 % (ref 0.3–6.2)
ERYTHROCYTE [DISTWIDTH] IN BLOOD BY AUTOMATED COUNT: 12.9 % (ref 12.3–15.4)
FERRITIN SERPL-MCNC: 160 NG/ML (ref 30–400)
HCT VFR BLD AUTO: 46.9 % (ref 37.5–51)
HGB BLD-MCNC: 15.7 G/DL (ref 13–17.7)
IMM GRANULOCYTES # BLD AUTO: 0 10*3/MM3 (ref 0–0.05)
IMM GRANULOCYTES NFR BLD AUTO: 0 % (ref 0–0.5)
LYMPHOCYTES # BLD AUTO: 1.17 10*3/MM3 (ref 0.7–3.1)
LYMPHOCYTES NFR BLD AUTO: 28.3 % (ref 19.6–45.3)
MCH RBC QN AUTO: 33.6 PG (ref 26.6–33)
MCHC RBC AUTO-ENTMCNC: 33.5 G/DL (ref 31.5–35.7)
MCV RBC AUTO: 100.4 FL (ref 79–97)
MONOCYTES # BLD AUTO: 0.39 10*3/MM3 (ref 0.1–0.9)
MONOCYTES NFR BLD AUTO: 9.4 % (ref 5–12)
NEUTROPHILS NFR BLD AUTO: 2.26 10*3/MM3 (ref 1.7–7)
NEUTROPHILS NFR BLD AUTO: 54.7 % (ref 42.7–76)
NRBC BLD AUTO-RTO: 0 /100 WBC (ref 0–0.2)
PLATELET # BLD AUTO: 119 10*3/MM3 (ref 140–450)
PMV BLD AUTO: 11 FL (ref 6–12)
RBC # BLD AUTO: 4.67 10*6/MM3 (ref 4.14–5.8)
WBC NRBC COR # BLD: 4.13 10*3/MM3 (ref 3.4–10.8)

## 2023-11-08 PROCEDURE — 99195 PHLEBOTOMY: CPT

## 2023-11-08 PROCEDURE — 36415 COLL VENOUS BLD VENIPUNCTURE: CPT

## 2023-11-08 PROCEDURE — 99214 OFFICE O/P EST MOD 30 MIN: CPT | Performed by: INTERNAL MEDICINE

## 2023-11-08 PROCEDURE — 82728 ASSAY OF FERRITIN: CPT

## 2023-11-08 PROCEDURE — 85025 COMPLETE CBC W/AUTO DIFF WBC: CPT

## 2023-11-08 RX ORDER — LISINOPRIL 2.5 MG/1
1 TABLET ORAL DAILY
COMMUNITY
Start: 2023-09-12

## 2023-11-08 RX ORDER — SODIUM CHLORIDE 9 MG/ML
250 INJECTION, SOLUTION INTRAVENOUS ONCE
OUTPATIENT
Start: 2023-11-08

## 2024-02-20 NOTE — PROGRESS NOTES
"[facility] CBC GROUP OUTPATIENT FOLLOW UP CLINIC VISIT    REASON FOR FOLLOW-UP:    1.  Thrombocytopenia  2.  Elevated ferritin with newly diagnosed hereditary hemochromatosis on 8/14/2019.  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  Baseline ferritin in the office on 8/14/2019 is 929, highest 1063 on 10/9/2019.       HISTORY OF PRESENT ILLNESS:  Pablito Bergman is a 69 y.o. male who returns today for follow up of the above issue.      He continues to do well.  Last phlebotomy was 3 months ago.      REVIEW OF SYSTEMS:  As per the [facility]      Vitals:    02/21/24 1425   BP: 120/82   Pulse: 69   Resp: 16   Temp: 97.1 °F (36.2 °C)   TempSrc: Oral   SpO2: 97%   Height: 193 cm (76\")   PainSc: 0-No pain           PHYSICAL EXAMINATION:  General:  No acute distress, awake, alert and oriented  Skin:  Warm and dry, no visible rash  HEENT:  Normocephalic/atraumatic.  Chest:  Normal respiratory effort  Extremities:  No visible clubbing, cyanosis, or edema  Neuro/psych:  Grossly nonfocal.  Normal mood and affect.     DIAGNOSTIC DATA:  CBC & Differential (02/21/2024 14:03)  Retic With IRF & RET-He (02/21/2024 14:03)  Iron Profile (02/21/2024 14:03)  Ferritin (02/21/2024 14:03)    IMAGING:  None reviewed    ASSESSMENT:  This is a 69 y.o. male with:    *Thrombocytopenia  This has been mild and chronic.  I did not see any evidence for platelet clumping on his peripheral blood smear.  He has been asymptomatic without bleeding.  His IPF was slightly elevated at 8.7%.  Vitamin B12 was normal at 304.  Continue to monitor.    Platelets 122,000    *Hereditary hemochromatosis:    He is a homozygote for the HFE gene C282Y mutation.    Baseline ferritin in the office on 8/14/2019 is 929.  Ferritin slowly improved with phlebotomy about every 2 to 3 weeks.    AFP normal at 2.17 on 2/18/2021  With aggressive phlebotomy, ferritin finally improved with a ferritin of 39.0 as of 6/7 and 66.5 as of 7/6/21.  11/8/2023: Ferritin 160, from 114, " from 109, from 91      PLAN:  He will have therapeutic phlebotomy today for ferritin of 158  Follow-up in 3 months with stat labs, appointment for therapeutic phlebotomy.  If his ferritin and iron do not start to improve, he will require phlebotomy more frequently.  Ultimately, goal ferritin less than 100.

## 2024-02-21 ENCOUNTER — LAB (OUTPATIENT)
Dept: LAB | Facility: HOSPITAL | Age: 69
End: 2024-02-21
Payer: COMMERCIAL

## 2024-02-21 ENCOUNTER — OFFICE VISIT (OUTPATIENT)
Dept: ONCOLOGY | Facility: CLINIC | Age: 69
End: 2024-02-21
Payer: COMMERCIAL

## 2024-02-21 ENCOUNTER — INFUSION (OUTPATIENT)
Dept: ONCOLOGY | Facility: HOSPITAL | Age: 69
End: 2024-02-21
Payer: COMMERCIAL

## 2024-02-21 VITALS
OXYGEN SATURATION: 97 % | DIASTOLIC BLOOD PRESSURE: 82 MMHG | SYSTOLIC BLOOD PRESSURE: 120 MMHG | HEIGHT: 76 IN | HEART RATE: 69 BPM | TEMPERATURE: 97.1 F | RESPIRATION RATE: 16 BRPM | BODY MASS INDEX: 19.52 KG/M2

## 2024-02-21 DIAGNOSIS — E83.110 HEREDITARY HEMOCHROMATOSIS: Primary | ICD-10-CM

## 2024-02-21 DIAGNOSIS — E83.110 HEREDITARY HEMOCHROMATOSIS: ICD-10-CM

## 2024-02-21 LAB
BASOPHILS # BLD AUTO: 0.09 10*3/MM3 (ref 0–0.2)
BASOPHILS NFR BLD AUTO: 1.9 % (ref 0–1.5)
DEPRECATED RDW RBC AUTO: 44.9 FL (ref 37–54)
EOSINOPHIL # BLD AUTO: 0.28 10*3/MM3 (ref 0–0.4)
EOSINOPHIL NFR BLD AUTO: 5.8 % (ref 0.3–6.2)
ERYTHROCYTE [DISTWIDTH] IN BLOOD BY AUTOMATED COUNT: 12.3 % (ref 12.3–15.4)
FERRITIN SERPL-MCNC: 158 NG/ML (ref 30–400)
HCT VFR BLD AUTO: 49.2 % (ref 37.5–51)
HGB BLD-MCNC: 16.5 G/DL (ref 13–17.7)
HGB RETIC QN AUTO: 36.2 PG (ref 29.8–36.1)
IMM GRANULOCYTES # BLD AUTO: 0.01 10*3/MM3 (ref 0–0.05)
IMM GRANULOCYTES NFR BLD AUTO: 0.2 % (ref 0–0.5)
IMM RETICS NFR: 4.1 % (ref 3–15.8)
IRON 24H UR-MRATE: 217 MCG/DL (ref 59–158)
IRON SATN MFR SERPL: 91 % (ref 20–50)
LYMPHOCYTES # BLD AUTO: 1.27 10*3/MM3 (ref 0.7–3.1)
LYMPHOCYTES NFR BLD AUTO: 26.4 % (ref 19.6–45.3)
MCH RBC QN AUTO: 33.2 PG (ref 26.6–33)
MCHC RBC AUTO-ENTMCNC: 33.5 G/DL (ref 31.5–35.7)
MCV RBC AUTO: 99 FL (ref 79–97)
MONOCYTES # BLD AUTO: 0.37 10*3/MM3 (ref 0.1–0.9)
MONOCYTES NFR BLD AUTO: 7.7 % (ref 5–12)
NEUTROPHILS NFR BLD AUTO: 2.79 10*3/MM3 (ref 1.7–7)
NEUTROPHILS NFR BLD AUTO: 58 % (ref 42.7–76)
NRBC BLD AUTO-RTO: 0 /100 WBC (ref 0–0.2)
PLATELET # BLD AUTO: 122 10*3/MM3 (ref 140–450)
PMV BLD AUTO: 11 FL (ref 6–12)
RBC # BLD AUTO: 4.97 10*6/MM3 (ref 4.14–5.8)
RETICS # AUTO: 0.05 10*6/MM3 (ref 0.02–0.13)
RETICS/RBC NFR AUTO: 0.95 % (ref 0.7–1.9)
TIBC SERPL-MCNC: 238 MCG/DL (ref 298–536)
TRANSFERRIN SERPL-MCNC: 170 MG/DL (ref 200–360)
WBC NRBC COR # BLD AUTO: 4.81 10*3/MM3 (ref 3.4–10.8)

## 2024-02-21 PROCEDURE — 36415 COLL VENOUS BLD VENIPUNCTURE: CPT

## 2024-02-21 PROCEDURE — 83540 ASSAY OF IRON: CPT

## 2024-02-21 PROCEDURE — 85046 RETICYTE/HGB CONCENTRATE: CPT

## 2024-02-21 PROCEDURE — 84466 ASSAY OF TRANSFERRIN: CPT

## 2024-02-21 PROCEDURE — 99214 OFFICE O/P EST MOD 30 MIN: CPT | Performed by: INTERNAL MEDICINE

## 2024-02-21 PROCEDURE — 85025 COMPLETE CBC W/AUTO DIFF WBC: CPT

## 2024-02-21 PROCEDURE — 99195 PHLEBOTOMY: CPT

## 2024-02-21 PROCEDURE — 82728 ASSAY OF FERRITIN: CPT

## 2024-02-27 ENCOUNTER — ON CAMPUS - OUTPATIENT (OUTPATIENT)
Dept: URBAN - METROPOLITAN AREA HOSPITAL 114 | Facility: HOSPITAL | Age: 69
End: 2024-02-27
Payer: COMMERCIAL

## 2024-02-27 ENCOUNTER — HOSPITAL ENCOUNTER (OUTPATIENT)
Facility: HOSPITAL | Age: 69
Setting detail: HOSPITAL OUTPATIENT SURGERY
Discharge: HOME | End: 2024-02-27
Attending: INTERNAL MEDICINE | Admitting: INTERNAL MEDICINE
Payer: COMMERCIAL

## 2024-02-27 ENCOUNTER — ANESTHESIA (OUTPATIENT)
Dept: GASTROENTEROLOGY | Facility: HOSPITAL | Age: 69
End: 2024-02-27
Payer: COMMERCIAL

## 2024-02-27 ENCOUNTER — ANESTHESIA EVENT (OUTPATIENT)
Dept: GASTROENTEROLOGY | Facility: HOSPITAL | Age: 69
End: 2024-02-27
Payer: COMMERCIAL

## 2024-02-27 VITALS
SYSTOLIC BLOOD PRESSURE: 113 MMHG | DIASTOLIC BLOOD PRESSURE: 75 MMHG | TEMPERATURE: 97.8 F | HEART RATE: 59 BPM | WEIGHT: 160.4 LBS | OXYGEN SATURATION: 98 % | BODY MASS INDEX: 19.53 KG/M2 | RESPIRATION RATE: 16 BRPM | HEIGHT: 76 IN

## 2024-02-27 DIAGNOSIS — D12.3 BENIGN NEOPLASM OF TRANSVERSE COLON: ICD-10-CM

## 2024-02-27 DIAGNOSIS — Z80.0 FAMILY HISTORY OF COLON CANCER: ICD-10-CM

## 2024-02-27 DIAGNOSIS — K64.0 FIRST DEGREE HEMORRHOIDS: ICD-10-CM

## 2024-02-27 DIAGNOSIS — Z12.11 ENCOUNTER FOR SCREENING FOR MALIGNANT NEOPLASM OF COLON: ICD-10-CM

## 2024-02-27 DIAGNOSIS — Z80.0 FAMILY HISTORY OF MALIGNANT NEOPLASM OF DIGESTIVE ORGANS: ICD-10-CM

## 2024-02-27 PROCEDURE — 88305 TISSUE EXAM BY PATHOLOGIST: CPT | Performed by: INTERNAL MEDICINE

## 2024-02-27 PROCEDURE — 25010000002 PHENYLEPHRINE 10 MG/ML SOLUTION: Performed by: ANESTHESIOLOGY

## 2024-02-27 PROCEDURE — 25810000003 LACTATED RINGERS PER 1000 ML: Performed by: INTERNAL MEDICINE

## 2024-02-27 PROCEDURE — 25010000002 PROPOFOL 10 MG/ML EMULSION: Performed by: ANESTHESIOLOGY

## 2024-02-27 PROCEDURE — 45380 COLONOSCOPY AND BIOPSY: CPT | Mod: 33 | Performed by: INTERNAL MEDICINE

## 2024-02-27 RX ORDER — PHENYLEPHRINE HYDROCHLORIDE 10 MG/ML
INJECTION INTRAVENOUS AS NEEDED
Status: DISCONTINUED | OUTPATIENT
Start: 2024-02-27 | End: 2024-02-27 | Stop reason: SURG

## 2024-02-27 RX ORDER — PROPOFOL 10 MG/ML
VIAL (ML) INTRAVENOUS AS NEEDED
Status: DISCONTINUED | OUTPATIENT
Start: 2024-02-27 | End: 2024-02-27 | Stop reason: SURG

## 2024-02-27 RX ORDER — SODIUM CHLORIDE, SODIUM LACTATE, POTASSIUM CHLORIDE, CALCIUM CHLORIDE 600; 310; 30; 20 MG/100ML; MG/100ML; MG/100ML; MG/100ML
30 INJECTION, SOLUTION INTRAVENOUS CONTINUOUS PRN
Status: DISCONTINUED | OUTPATIENT
Start: 2024-02-27 | End: 2024-02-27 | Stop reason: HOSPADM

## 2024-02-27 RX ORDER — LIDOCAINE HYDROCHLORIDE 20 MG/ML
INJECTION, SOLUTION INFILTRATION; PERINEURAL AS NEEDED
Status: DISCONTINUED | OUTPATIENT
Start: 2024-02-27 | End: 2024-02-27 | Stop reason: SURG

## 2024-02-27 RX ADMIN — SODIUM CHLORIDE, POTASSIUM CHLORIDE, SODIUM LACTATE AND CALCIUM CHLORIDE 30 ML/HR: 600; 310; 30; 20 INJECTION, SOLUTION INTRAVENOUS at 10:07

## 2024-02-27 RX ADMIN — PHENYLEPHRINE HYDROCHLORIDE 160 MCG: 10 INJECTION INTRAVENOUS at 10:37

## 2024-02-27 RX ADMIN — PROPOFOL 290 MG: 10 INJECTION, EMULSION INTRAVENOUS at 10:32

## 2024-02-27 RX ADMIN — LIDOCAINE HYDROCHLORIDE 100 MG: 20 INJECTION, SOLUTION INFILTRATION; PERINEURAL at 10:30

## 2024-02-27 RX ADMIN — PHENYLEPHRINE HYDROCHLORIDE 160 MCG: 10 INJECTION INTRAVENOUS at 10:48

## 2024-02-27 NOTE — H&P
[facility]   HISTORY AND PHYSICAL    Patient Name: Pablito Bergman  : 1955  MRN: [id number]  Primary Care Physician:  Wendi Hanson MD  Date of admission: 2024    Subjective   Subjective     Chief Complaint: fhx colon cancer     History of Present Illness  Patient has had a mother and brother with colon cancer   Review of Systems   All other systems reviewed and are negative.       Personal History     Past Medical History:   Diagnosis Date    H/O foreign travel 2019    Gabriel/Jovani    Headache     Hereditary hemochromatosis     Hypertension     Kidney stone     Left inguinal hernia     Skin cancer     Thoracic aortic aneurysm        Past Surgical History:   Procedure Laterality Date    COLONOSCOPY      INGUINAL HERNIA REPAIR Left     KIDNEY STONE SURGERY      KNEE SURGERY Bilateral     SKIN BIOPSY         Family History: family history includes Cancer in his maternal grandmother; Colon cancer in his brother and mother; Prostate cancer in his maternal grandfather. Otherwise pertinent FHx was reviewed and not pertinent to current issue.    Social History:  reports that he has been smoking cigarettes. He has a 37.50 pack-year smoking history. He has never used smokeless tobacco. He reports current alcohol use. He reports that he does not use drugs.    Home Medications:  lisinopril and metoprolol succinate XL    Allergies:  Allergies   Allergen Reactions    Other Unknown - Low Severity     Red pepper headache reaction       Objective    Objective     Vitals:   Temp:  [97.8 °F (36.6 °C)] 97.8 °F (36.6 °C)  Heart Rate:  [65] 65  BP: (108)/(77) 108/77    Physical Exam  HENT:      Right Ear: External ear normal.      Left Ear: External ear normal.      Mouth/Throat:      Pharynx: Oropharynx is clear.   Eyes:      Conjunctiva/sclera: Conjunctivae normal.   Cardiovascular:      Rate and Rhythm: Normal rate.      Pulses: Normal pulses.   Pulmonary:      Effort: Pulmonary effort is normal.    Abdominal:      General: Abdomen is flat.   Skin:     General: Skin is warm.   Neurological:      General: No focal deficit present.      Mental Status: He is alert.   Psychiatric:         Mood and Affect: Mood normal.         Result Review    Result Review:  I have personally reviewed the results from the time of this admission to 2/27/2024 10:31 EST and agree with these findings:  []  Laboratory list / accordion  []  Microbiology  []  Radiology  []  EKG/Telemetry   []  Cardiology/Vascular   []  Pathology  []  Old records  []  Other:  Most notable findings include:       Assessment & Plan   Assessment / Plan     Brief Patient Summary:  Pablito Bergman is a 69 y.o. male who   Family history of colon cancer   Active Hospital Problems:  There are no active hospital problems to display for this patient.    Plan: Colonoscopy risks, alternatives and benefits discussed with patient and the patient is agreeable to having procedure done.      DVT prophylaxis:  No DVT prophylaxis order currently exists.        CODE STATUS:       Admission Status:  I believe this patient meets outpatient.  status.    Skip Stallings MD

## 2024-02-27 NOTE — DISCHARGE INSTRUCTIONS
For the next 24 hours patient needs to be with a responsible adult.    For 24 hours DO NOT drive, operate machinery, appliances, drink alcohol, make important decisions or sign legal documents.    Start with a light or bland diet if you are feeling sick to your stomach otherwise advance to regular diet as tolerated.    Follow recommendations on procedure report if provided by your doctor.    Call Dr Stallings for problems [phone]    Problems may include but not limited to: large amounts of bleeding, trouble breathing, repeated vomiting, severe unrelieved pain, fever or chills.

## 2024-02-27 NOTE — ANESTHESIA PREPROCEDURE EVALUATION
Anesthesia Evaluation     NPO Solid Status: > 8 hours  NPO Liquid Status: > 8 hours           Airway   Mallampati: I  No difficulty expected  Dental      Pulmonary    Cardiovascular   Exercise tolerance: good (4-7 METS)    (+) hypertension      Neuro/Psych  (+) headaches  GI/Hepatic/Renal/Endo    (+) renal disease-    Musculoskeletal     Abdominal    Substance History      OB/GYN          Other      history of cancer                Anesthesia Plan    ASA 2     MAC     intravenous induction     Anesthetic plan, risks, benefits, and alternatives have been provided, discussed and informed consent has been obtained with: patient.    CODE STATUS:

## 2024-02-27 NOTE — ANESTHESIA POSTPROCEDURE EVALUATION
"Patient: Pablito Bergman    Procedure Summary       Date: 02/27/24 Room / Location:  [facility]    Anesthesia Start: 1025 Anesthesia Stop: 1059    Procedure: COLONOSCOPY TO CECUM WITH COLD POLYPECTOMIES Diagnosis:     Surgeons: Skip Stallings MD Provider: Pablito Garcia MD    Anesthesia Type: MAC ASA Status: 2            Anesthesia Type: MAC    Vitals  Vitals Value Taken Time   /75 02/27/24 1114   Temp     Pulse 59 02/27/24 1114   Resp 16 02/27/24 1114   SpO2 98 % 02/27/24 1114           Post Anesthesia Care and Evaluation    Patient location during evaluation: bedside  Patient participation: complete - patient participated  Level of consciousness: awake and alert  Pain management: adequate    Airway patency: patent  Anesthetic complications: No anesthetic complications    Cardiovascular status: acceptable  Respiratory status: acceptable  Hydration status: acceptable    Comments: /75   Pulse 59   Temp 36.6 °C (97.8 °F) (Oral)   Resp 16   Ht 193 cm (76\")   Wt 72.8 kg (160 lb 6.4 oz)   SpO2 98%   BMI 19.52 kg/m²     "

## 2024-02-28 LAB
LAB AP CASE REPORT: NORMAL
PATH REPORT.FINAL DX SPEC: NORMAL
PATH REPORT.GROSS SPEC: NORMAL

## 2024-04-02 ENCOUNTER — TELEPHONE (OUTPATIENT)
Dept: ONCOLOGY | Facility: CLINIC | Age: 69
End: 2024-04-02
Payer: COMMERCIAL

## 2024-04-02 NOTE — TELEPHONE ENCOUNTER
Caller: Pablito Bergman    Relationship to patient: Self    Best call back number: [phone]    Chief complaint: WILL BE OUT OF TOWN    Type of visit: LAB, FOLLOW UP, POSS PHLEBO    Requested date: AFTER 5-22     If rescheduling, when is the original appointment: 5-22     Additional notes:PLEASE ADVISE

## 2024-04-05 ENCOUNTER — HOSPITAL ENCOUNTER (OUTPATIENT)
Dept: CT IMAGING | Facility: HOSPITAL | Age: 69
Discharge: HOME | End: 2024-04-05
Admitting: INTERNAL MEDICINE
Payer: COMMERCIAL

## 2024-04-05 DIAGNOSIS — R91.1 PULMONARY NODULE: ICD-10-CM

## 2024-04-05 PROCEDURE — 71250 CT THORAX DX C-: CPT

## 2024-06-05 NOTE — PROGRESS NOTES
"[facility] CBC GROUP OUTPATIENT FOLLOW UP CLINIC VISIT    REASON FOR FOLLOW-UP:    1.  Thrombocytopenia  2.  Elevated ferritin with newly diagnosed hereditary hemochromatosis on 8/14/2019.  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  Baseline ferritin in the office on 8/14/2019 is 929, highest 1063 on 10/9/2019.       HISTORY OF PRESENT ILLNESS:  Pablito Bergman is a 69 y.o. male who returns today for follow up of the above issue.      He feels quite well.  He recently returned from a trip to [place].  Unfortunately, at the end of this trip he developed COVID infection.  He recovered from this.  Otherwise no issues.    REVIEW OF SYSTEMS:  As per the HPI      Vitals:    06/11/24 1420   BP: 116/81   Pulse: 61   Temp: 97.7 °F (36.5 °C)   TempSrc: Oral   SpO2: 98%   Weight: 73.4 kg (161 lb 14.4 oz)   Height: 193 cm (75.98\")   PainSc: 0-No pain       PHYSICAL EXAMINATION:  General:  No acute distress, awake, alert and oriented  Skin:  Warm and dry, no visible rash  HEENT:  Normocephalic/atraumatic.  Chest:  Normal respiratory effort  Extremities:  No visible clubbing, cyanosis, or edema  Neuro/psych:  Grossly nonfocal.  Normal mood and affect.     DIAGNOSTIC DATA:  Retic With IRF & RET-He (06/11/2024 14:18)  CBC & Differential (06/11/2024 14:18)  Ferritin (06/11/2024 14:18)  Iron Profile (06/11/2024 14:18)    IMAGING:  None reviewed    ASSESSMENT:  This is a 69 y.o. male with:    *Thrombocytopenia  This has been mild and chronic.  I did not see any evidence for platelet clumping on his peripheral blood smear.  He has been asymptomatic without bleeding.  His IPF was slightly elevated at 8.7%.  Vitamin B12 was normal at 304.  Continue to monitor.    Platelets 116,000    *Hereditary hemochromatosis:    He is a homozygote for the HFE gene C282Y mutation.    Baseline ferritin in the office on 8/14/2019 is 929.  Ferritin slowly improved with phlebotomy about every 2 to 3 weeks.    AFP normal at 2.17 on " 2/18/2021  With aggressive phlebotomy, ferritin finally improved with a ferritin of 39.0 as of 6/7 and 66.5 as of 7/6/21.  6/11/2024: Ferritin 121, from 158, from 160.  Iron 209 with 86% iron saturation.      PLAN:  Therapeutic phlebotomy today  Follow-up in 3 months with stat labs, appointment for therapeutic phlebotomy if appropriate  Increase frequency of phlebotomy if his ferritin rises further. Ultimately, goal ferritin less than 100.    I spent 30 minutes in this visit today reviewing his record, communicating with him, placing orders, documenting the encounter.

## 2024-06-11 ENCOUNTER — OFFICE VISIT (OUTPATIENT)
Dept: ONCOLOGY | Facility: CLINIC | Age: 69
End: 2024-06-11
Payer: COMMERCIAL

## 2024-06-11 ENCOUNTER — INFUSION (OUTPATIENT)
Dept: ONCOLOGY | Facility: HOSPITAL | Age: 69
End: 2024-06-11
Payer: COMMERCIAL

## 2024-06-11 ENCOUNTER — LAB (OUTPATIENT)
Dept: LAB | Facility: HOSPITAL | Age: 69
End: 2024-06-11
Payer: COMMERCIAL

## 2024-06-11 VITALS
DIASTOLIC BLOOD PRESSURE: 81 MMHG | OXYGEN SATURATION: 98 % | BODY MASS INDEX: 19.72 KG/M2 | HEART RATE: 61 BPM | SYSTOLIC BLOOD PRESSURE: 116 MMHG | WEIGHT: 161.9 LBS | HEIGHT: 76 IN | TEMPERATURE: 97.7 F

## 2024-06-11 DIAGNOSIS — E83.110 HEREDITARY HEMOCHROMATOSIS: Primary | ICD-10-CM

## 2024-06-11 DIAGNOSIS — E83.110 HEREDITARY HEMOCHROMATOSIS: ICD-10-CM

## 2024-06-11 LAB
BASOPHILS # BLD AUTO: 0.07 10*3/MM3 (ref 0–0.2)
BASOPHILS NFR BLD AUTO: 1.5 % (ref 0–1.5)
DEPRECATED RDW RBC AUTO: 46.8 FL (ref 37–54)
EOSINOPHIL # BLD AUTO: 0.33 10*3/MM3 (ref 0–0.4)
EOSINOPHIL NFR BLD AUTO: 7.1 % (ref 0.3–6.2)
ERYTHROCYTE [DISTWIDTH] IN BLOOD BY AUTOMATED COUNT: 12.7 % (ref 12.3–15.4)
FERRITIN SERPL-MCNC: 121 NG/ML (ref 30–400)
HCT VFR BLD AUTO: 46.8 % (ref 37.5–51)
HGB BLD-MCNC: 15.2 G/DL (ref 13–17.7)
HGB RETIC QN AUTO: 36.9 PG (ref 29.8–36.1)
IMM GRANULOCYTES # BLD AUTO: 0 10*3/MM3 (ref 0–0.05)
IMM GRANULOCYTES NFR BLD AUTO: 0 % (ref 0–0.5)
IMM RETICS NFR: 3 % (ref 3–15.8)
IRON 24H UR-MRATE: 209 MCG/DL (ref 59–158)
IRON SATN MFR SERPL: 86 % (ref 20–50)
LYMPHOCYTES # BLD AUTO: 1.33 10*3/MM3 (ref 0.7–3.1)
LYMPHOCYTES NFR BLD AUTO: 28.4 % (ref 19.6–45.3)
MCH RBC QN AUTO: 32.2 PG (ref 26.6–33)
MCHC RBC AUTO-ENTMCNC: 32.5 G/DL (ref 31.5–35.7)
MCV RBC AUTO: 99.2 FL (ref 79–97)
MONOCYTES # BLD AUTO: 0.46 10*3/MM3 (ref 0.1–0.9)
MONOCYTES NFR BLD AUTO: 9.8 % (ref 5–12)
NEUTROPHILS NFR BLD AUTO: 2.49 10*3/MM3 (ref 1.7–7)
NEUTROPHILS NFR BLD AUTO: 53.2 % (ref 42.7–76)
NRBC BLD AUTO-RTO: 0 /100 WBC (ref 0–0.2)
PLATELET # BLD AUTO: 116 10*3/MM3 (ref 140–450)
PMV BLD AUTO: 10.7 FL (ref 6–12)
RBC # BLD AUTO: 4.72 10*6/MM3 (ref 4.14–5.8)
RETICS # AUTO: 0.05 10*6/MM3 (ref 0.02–0.13)
RETICS/RBC NFR AUTO: 1.05 % (ref 0.7–1.9)
TIBC SERPL-MCNC: 243 MCG/DL (ref 298–536)
TRANSFERRIN SERPL-MCNC: 163 MG/DL (ref 200–360)
WBC NRBC COR # BLD AUTO: 4.68 10*3/MM3 (ref 3.4–10.8)

## 2024-06-11 PROCEDURE — 85046 RETICYTE/HGB CONCENTRATE: CPT

## 2024-06-11 PROCEDURE — 99195 PHLEBOTOMY: CPT

## 2024-06-11 PROCEDURE — 99214 OFFICE O/P EST MOD 30 MIN: CPT | Performed by: INTERNAL MEDICINE

## 2024-06-11 PROCEDURE — 84466 ASSAY OF TRANSFERRIN: CPT

## 2024-06-11 PROCEDURE — 85025 COMPLETE CBC W/AUTO DIFF WBC: CPT

## 2024-06-11 PROCEDURE — 36415 COLL VENOUS BLD VENIPUNCTURE: CPT

## 2024-06-11 PROCEDURE — 82728 ASSAY OF FERRITIN: CPT

## 2024-06-11 PROCEDURE — 83540 ASSAY OF IRON: CPT

## 2024-08-05 ENCOUNTER — LAB (OUTPATIENT)
Dept: LAB | Facility: HOSPITAL | Age: 69
End: 2024-08-05
Payer: COMMERCIAL

## 2024-08-05 ENCOUNTER — TRANSCRIBE ORDERS (OUTPATIENT)
Dept: LAB | Facility: HOSPITAL | Age: 69
End: 2024-08-05
Payer: COMMERCIAL

## 2024-08-05 DIAGNOSIS — I10 ESSENTIAL HYPERTENSION, MALIGNANT: ICD-10-CM

## 2024-08-05 DIAGNOSIS — D69.6 THROMBOCYTOPENIA, UNSPECIFIED: Primary | ICD-10-CM

## 2024-08-05 DIAGNOSIS — D69.6 THROMBOCYTOPENIA, UNSPECIFIED: ICD-10-CM

## 2024-08-05 DIAGNOSIS — Z12.5 SPECIAL SCREENING, PROSTATE CANCER: ICD-10-CM

## 2024-08-05 DIAGNOSIS — I25.10 DISEASE OF CARDIOVASCULAR SYSTEM: ICD-10-CM

## 2024-08-05 LAB
ALBUMIN SERPL-MCNC: 4.5 G/DL (ref 3.5–5.2)
ALBUMIN/GLOB SERPL: 2 G/DL
ALP SERPL-CCNC: 93 U/L (ref 39–117)
ALT SERPL W P-5'-P-CCNC: 17 U/L (ref 1–41)
ANION GAP SERPL CALCULATED.3IONS-SCNC: 7 MMOL/L (ref 5–15)
AST SERPL-CCNC: 22 U/L (ref 1–40)
BASOPHILS # BLD AUTO: 0.07 10*3/MM3 (ref 0–0.2)
BASOPHILS NFR BLD AUTO: 1.7 % (ref 0–1.5)
BILIRUB SERPL-MCNC: 0.5 MG/DL (ref 0–1.2)
BILIRUB UR QL STRIP: NEGATIVE
BUN SERPL-MCNC: 19 MG/DL (ref 8–23)
BUN/CREAT SERPL: 20.4 (ref 7–25)
CALCIUM SPEC-SCNC: 9.8 MG/DL (ref 8.6–10.5)
CHLORIDE SERPL-SCNC: 107 MMOL/L (ref 98–107)
CHOLEST SERPL-MCNC: 130 MG/DL (ref 0–200)
CLARITY UR: CLEAR
CO2 SERPL-SCNC: 28 MMOL/L (ref 22–29)
COLOR UR: YELLOW
CREAT SERPL-MCNC: 0.93 MG/DL (ref 0.76–1.27)
DEPRECATED RDW RBC AUTO: 44.6 FL (ref 37–54)
EGFRCR SERPLBLD CKD-EPI 2021: 88.3 ML/MIN/1.73
EOSINOPHIL # BLD AUTO: 0.31 10*3/MM3 (ref 0–0.4)
EOSINOPHIL NFR BLD AUTO: 7.4 % (ref 0.3–6.2)
ERYTHROCYTE [DISTWIDTH] IN BLOOD BY AUTOMATED COUNT: 12 % (ref 12.3–15.4)
GLOBULIN UR ELPH-MCNC: 2.3 GM/DL
GLUCOSE SERPL-MCNC: 106 MG/DL (ref 65–99)
GLUCOSE UR STRIP-MCNC: NEGATIVE MG/DL
HBA1C MFR BLD: 5.3 % (ref 4.8–5.6)
HCT VFR BLD AUTO: 48.7 % (ref 37.5–51)
HDLC SERPL-MCNC: 51 MG/DL (ref 40–60)
HGB BLD-MCNC: 16 G/DL (ref 13–17.7)
HGB UR QL STRIP.AUTO: NEGATIVE
IMM GRANULOCYTES # BLD AUTO: 0.01 10*3/MM3 (ref 0–0.05)
IMM GRANULOCYTES NFR BLD AUTO: 0.2 % (ref 0–0.5)
KETONES UR QL STRIP: NEGATIVE
LDLC SERPL CALC-MCNC: 67 MG/DL (ref 0–100)
LDLC/HDLC SERPL: 1.33 {RATIO}
LEUKOCYTE ESTERASE UR QL STRIP.AUTO: NEGATIVE
LYMPHOCYTES # BLD AUTO: 1.1 10*3/MM3 (ref 0.7–3.1)
LYMPHOCYTES NFR BLD AUTO: 26.3 % (ref 19.6–45.3)
MCH RBC QN AUTO: 32.7 PG (ref 26.6–33)
MCHC RBC AUTO-ENTMCNC: 32.9 G/DL (ref 31.5–35.7)
MCV RBC AUTO: 99.4 FL (ref 79–97)
MONOCYTES # BLD AUTO: 0.35 10*3/MM3 (ref 0.1–0.9)
MONOCYTES NFR BLD AUTO: 8.4 % (ref 5–12)
NEUTROPHILS NFR BLD AUTO: 2.34 10*3/MM3 (ref 1.7–7)
NEUTROPHILS NFR BLD AUTO: 56 % (ref 42.7–76)
NITRITE UR QL STRIP: NEGATIVE
PH UR STRIP.AUTO: 5.5 [PH] (ref 5–8)
PLATELET # BLD AUTO: 119 10*3/MM3 (ref 140–450)
PMV BLD AUTO: 10.6 FL (ref 6–12)
POTASSIUM SERPL-SCNC: 4.7 MMOL/L (ref 3.5–5.2)
PROT SERPL-MCNC: 6.8 G/DL (ref 6–8.5)
PROT UR QL STRIP: NEGATIVE
PSA SERPL-MCNC: 0.79 NG/ML (ref 0–4)
RBC # BLD AUTO: 4.9 10*6/MM3 (ref 4.14–5.8)
SODIUM SERPL-SCNC: 142 MMOL/L (ref 136–145)
SP GR UR STRIP: 1.02 (ref 1–1.03)
TRIGL SERPL-MCNC: 56 MG/DL (ref 0–150)
UROBILINOGEN UR QL STRIP: NORMAL
VLDLC SERPL-MCNC: 12 MG/DL (ref 5–40)
WBC NRBC COR # BLD AUTO: 4.18 10*3/MM3 (ref 3.4–10.8)

## 2024-08-05 PROCEDURE — 83036 HEMOGLOBIN GLYCOSYLATED A1C: CPT

## 2024-08-05 PROCEDURE — 80053 COMPREHEN METABOLIC PANEL: CPT

## 2024-08-05 PROCEDURE — 81003 URINALYSIS AUTO W/O SCOPE: CPT

## 2024-08-05 PROCEDURE — 85025 COMPLETE CBC W/AUTO DIFF WBC: CPT

## 2024-08-05 PROCEDURE — G0103 PSA SCREENING: HCPCS

## 2024-08-05 PROCEDURE — 36415 COLL VENOUS BLD VENIPUNCTURE: CPT

## 2024-08-05 PROCEDURE — 80061 LIPID PANEL: CPT

## 2024-08-20 ENCOUNTER — TRANSCRIBE ORDERS (OUTPATIENT)
Dept: ADMINISTRATIVE | Facility: HOSPITAL | Age: 69
End: 2024-08-20
Payer: COMMERCIAL

## 2024-08-20 DIAGNOSIS — I71.21 AORTIC ROOT ANEURYSM: Primary | ICD-10-CM

## 2024-09-17 ENCOUNTER — INFUSION (OUTPATIENT)
Dept: ONCOLOGY | Facility: HOSPITAL | Age: 69
End: 2024-09-17
Payer: MEDICARE

## 2024-09-17 ENCOUNTER — OFFICE VISIT (OUTPATIENT)
Dept: ONCOLOGY | Facility: CLINIC | Age: 69
End: 2024-09-17
Payer: MEDICARE

## 2024-09-17 ENCOUNTER — LAB (OUTPATIENT)
Dept: LAB | Facility: HOSPITAL | Age: 69
End: 2024-09-17
Payer: MEDICARE

## 2024-09-17 VITALS
TEMPERATURE: 98.3 F | HEIGHT: 76 IN | DIASTOLIC BLOOD PRESSURE: 77 MMHG | WEIGHT: 160.9 LBS | BODY MASS INDEX: 19.59 KG/M2 | OXYGEN SATURATION: 95 % | RESPIRATION RATE: 16 BRPM | HEART RATE: 73 BPM | SYSTOLIC BLOOD PRESSURE: 116 MMHG

## 2024-09-17 DIAGNOSIS — E83.110 HEREDITARY HEMOCHROMATOSIS: Primary | ICD-10-CM

## 2024-09-17 DIAGNOSIS — E83.110 HEREDITARY HEMOCHROMATOSIS: ICD-10-CM

## 2024-09-17 LAB
BASOPHILS # BLD AUTO: 0.08 10*3/MM3 (ref 0–0.2)
BASOPHILS NFR BLD AUTO: 1.7 % (ref 0–1.5)
DEPRECATED RDW RBC AUTO: 45.8 FL (ref 37–54)
EOSINOPHIL # BLD AUTO: 0.41 10*3/MM3 (ref 0–0.4)
EOSINOPHIL NFR BLD AUTO: 8.7 % (ref 0.3–6.2)
ERYTHROCYTE [DISTWIDTH] IN BLOOD BY AUTOMATED COUNT: 12.6 % (ref 12.3–15.4)
FERRITIN SERPL-MCNC: 129 NG/ML (ref 30–400)
HCT VFR BLD AUTO: 47.7 % (ref 37.5–51)
HGB BLD-MCNC: 15.9 G/DL (ref 13–17.7)
HGB RETIC QN AUTO: 36.9 PG (ref 29.8–36.1)
IMM GRANULOCYTES # BLD AUTO: 0.02 10*3/MM3 (ref 0–0.05)
IMM GRANULOCYTES NFR BLD AUTO: 0.4 % (ref 0–0.5)
IMM RETICS NFR: 5.5 % (ref 3–15.8)
IRON 24H UR-MRATE: 153 MCG/DL (ref 59–158)
IRON SATN MFR SERPL: 58 % (ref 20–50)
LYMPHOCYTES # BLD AUTO: 0.82 10*3/MM3 (ref 0.7–3.1)
LYMPHOCYTES NFR BLD AUTO: 17.4 % (ref 19.6–45.3)
MCH RBC QN AUTO: 32.9 PG (ref 26.6–33)
MCHC RBC AUTO-ENTMCNC: 33.3 G/DL (ref 31.5–35.7)
MCV RBC AUTO: 98.8 FL (ref 79–97)
MONOCYTES # BLD AUTO: 0.39 10*3/MM3 (ref 0.1–0.9)
MONOCYTES NFR BLD AUTO: 8.3 % (ref 5–12)
NEUTROPHILS NFR BLD AUTO: 2.98 10*3/MM3 (ref 1.7–7)
NEUTROPHILS NFR BLD AUTO: 63.5 % (ref 42.7–76)
NRBC BLD AUTO-RTO: 0 /100 WBC (ref 0–0.2)
PLATELET # BLD AUTO: 108 10*3/MM3 (ref 140–450)
PMV BLD AUTO: 11.2 FL (ref 6–12)
RBC # BLD AUTO: 4.83 10*6/MM3 (ref 4.14–5.8)
RETICS # AUTO: 0.06 10*6/MM3 (ref 0.02–0.13)
RETICS/RBC NFR AUTO: 1.3 % (ref 0.7–1.9)
TIBC SERPL-MCNC: 264 MCG/DL (ref 298–536)
TRANSFERRIN SERPL-MCNC: 177 MG/DL (ref 200–360)
WBC NRBC COR # BLD AUTO: 4.7 10*3/MM3 (ref 3.4–10.8)

## 2024-09-17 PROCEDURE — 83540 ASSAY OF IRON: CPT

## 2024-09-17 PROCEDURE — 85025 COMPLETE CBC W/AUTO DIFF WBC: CPT

## 2024-09-17 PROCEDURE — 84466 ASSAY OF TRANSFERRIN: CPT

## 2024-09-17 PROCEDURE — 85046 RETICYTE/HGB CONCENTRATE: CPT

## 2024-09-17 PROCEDURE — 99213 OFFICE O/P EST LOW 20 MIN: CPT | Performed by: INTERNAL MEDICINE

## 2024-09-17 PROCEDURE — 1126F AMNT PAIN NOTED NONE PRSNT: CPT | Performed by: INTERNAL MEDICINE

## 2024-09-17 PROCEDURE — 1159F MED LIST DOCD IN RCRD: CPT | Performed by: INTERNAL MEDICINE

## 2024-09-17 PROCEDURE — 82728 ASSAY OF FERRITIN: CPT

## 2024-09-17 PROCEDURE — 1160F RVW MEDS BY RX/DR IN RCRD: CPT | Performed by: INTERNAL MEDICINE

## 2024-09-17 PROCEDURE — 36415 COLL VENOUS BLD VENIPUNCTURE: CPT

## 2024-09-17 PROCEDURE — 99195 PHLEBOTOMY: CPT

## 2024-09-17 PROCEDURE — G2211 COMPLEX E/M VISIT ADD ON: HCPCS | Performed by: INTERNAL MEDICINE

## 2024-09-17 RX ORDER — SODIUM CHLORIDE 9 MG/ML
250 INJECTION, SOLUTION INTRAVENOUS ONCE
OUTPATIENT
Start: 2024-09-17

## 2024-12-16 RX ORDER — SODIUM CHLORIDE 9 MG/ML
250 INJECTION, SOLUTION INTRAVENOUS ONCE
OUTPATIENT
Start: 2024-12-16

## 2024-12-17 ENCOUNTER — LAB (OUTPATIENT)
Dept: LAB | Facility: HOSPITAL | Age: 69
End: 2024-12-17
Payer: MEDICARE

## 2024-12-17 ENCOUNTER — INFUSION (OUTPATIENT)
Dept: ONCOLOGY | Facility: HOSPITAL | Age: 69
End: 2024-12-17
Payer: MEDICARE

## 2024-12-17 VITALS
HEART RATE: 67 BPM | TEMPERATURE: 97.7 F | SYSTOLIC BLOOD PRESSURE: 139 MMHG | DIASTOLIC BLOOD PRESSURE: 85 MMHG | WEIGHT: 164.8 LBS | BODY MASS INDEX: 20.07 KG/M2 | RESPIRATION RATE: 16 BRPM | OXYGEN SATURATION: 98 %

## 2024-12-17 DIAGNOSIS — E83.110 HEREDITARY HEMOCHROMATOSIS: Primary | ICD-10-CM

## 2024-12-17 DIAGNOSIS — E83.110 HEREDITARY HEMOCHROMATOSIS: ICD-10-CM

## 2024-12-17 LAB
BASOPHILS # BLD AUTO: 0.08 10*3/MM3 (ref 0–0.2)
BASOPHILS NFR BLD AUTO: 1.6 % (ref 0–1.5)
DEPRECATED RDW RBC AUTO: 47.2 FL (ref 37–54)
EOSINOPHIL # BLD AUTO: 0.48 10*3/MM3 (ref 0–0.4)
EOSINOPHIL NFR BLD AUTO: 9.4 % (ref 0.3–6.2)
ERYTHROCYTE [DISTWIDTH] IN BLOOD BY AUTOMATED COUNT: 12.7 % (ref 12.3–15.4)
FERRITIN SERPL-MCNC: 124 NG/ML (ref 30–400)
HCT VFR BLD AUTO: 48.7 % (ref 37.5–51)
HGB BLD-MCNC: 16.1 G/DL (ref 13–17.7)
HGB RETIC QN AUTO: 36.3 PG (ref 29.8–36.1)
IMM GRANULOCYTES # BLD AUTO: 0.02 10*3/MM3 (ref 0–0.05)
IMM GRANULOCYTES NFR BLD AUTO: 0.4 % (ref 0–0.5)
IMM RETICS NFR: 7.2 % (ref 3–15.8)
IRON 24H UR-MRATE: 145 MCG/DL (ref 59–158)
IRON SATN MFR SERPL: 55 % (ref 20–50)
LYMPHOCYTES # BLD AUTO: 1.4 10*3/MM3 (ref 0.7–3.1)
LYMPHOCYTES NFR BLD AUTO: 27.5 % (ref 19.6–45.3)
MCH RBC QN AUTO: 32.9 PG (ref 26.6–33)
MCHC RBC AUTO-ENTMCNC: 33.1 G/DL (ref 31.5–35.7)
MCV RBC AUTO: 99.6 FL (ref 79–97)
MONOCYTES # BLD AUTO: 0.47 10*3/MM3 (ref 0.1–0.9)
MONOCYTES NFR BLD AUTO: 9.2 % (ref 5–12)
NEUTROPHILS NFR BLD AUTO: 2.65 10*3/MM3 (ref 1.7–7)
NEUTROPHILS NFR BLD AUTO: 51.9 % (ref 42.7–76)
NRBC BLD AUTO-RTO: 0 /100 WBC (ref 0–0.2)
PLATELET # BLD AUTO: 120 10*3/MM3 (ref 140–450)
PMV BLD AUTO: 10.8 FL (ref 6–12)
RBC # BLD AUTO: 4.89 10*6/MM3 (ref 4.14–5.8)
RETICS # AUTO: 0.05 10*6/MM3 (ref 0.02–0.13)
RETICS/RBC NFR AUTO: 1 % (ref 0.7–1.9)
TIBC SERPL-MCNC: 264 MCG/DL (ref 298–536)
TRANSFERRIN SERPL-MCNC: 177 MG/DL (ref 200–360)
WBC NRBC COR # BLD AUTO: 5.1 10*3/MM3 (ref 3.4–10.8)

## 2024-12-17 PROCEDURE — 99195 PHLEBOTOMY: CPT

## 2024-12-17 PROCEDURE — 84466 ASSAY OF TRANSFERRIN: CPT

## 2024-12-17 PROCEDURE — 85025 COMPLETE CBC W/AUTO DIFF WBC: CPT

## 2024-12-17 PROCEDURE — 36415 COLL VENOUS BLD VENIPUNCTURE: CPT

## 2024-12-17 PROCEDURE — 82728 ASSAY OF FERRITIN: CPT

## 2024-12-17 PROCEDURE — 85046 RETICYTE/HGB CONCENTRATE: CPT

## 2024-12-17 PROCEDURE — 83540 ASSAY OF IRON: CPT

## 2025-04-10 ENCOUNTER — HOSPITAL ENCOUNTER (OUTPATIENT)
Dept: CT IMAGING | Facility: HOSPITAL | Age: 70
Discharge: HOME | End: 2025-04-10
Admitting: INTERNAL MEDICINE
Payer: MEDICARE

## 2025-04-10 DIAGNOSIS — Q25.43 AORTIC ROOT ANEURYSM: ICD-10-CM

## 2025-04-10 PROCEDURE — 71250 CT THORAX DX C-: CPT

## 2025-04-15 NOTE — PROGRESS NOTES
"[facility] CBC GROUP OUTPATIENT FOLLOW UP CLINIC VISIT    REASON FOR FOLLOW-UP:    1.  Thrombocytopenia  2.  Elevated ferritin with newly diagnosed hereditary hemochromatosis on 8/14/2019.  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  Baseline ferritin in the office on 8/14/2019 is 929, highest 1063 on 10/9/2019.       HISTORY OF PRESENT ILLNESS:  Pablito Bergman is a 70 y.o. male who returns today for follow up of the above issue.      He continues to do well.  He tolerates phlebotomy well.  He has required this approximately every 3 to 4 months.    REVIEW OF SYSTEMS:  As per the HPI      Vitals:    04/16/25 0851   BP: 146/96   Pulse: 63   Resp: 16   Temp: 97.7 °F (36.5 °C)   TempSrc: Oral   SpO2: 96%   Weight: 75.7 kg (166 lb 14.4 oz)   Height: 193 cm (75.98\")   PainSc: 0-No pain       PHYSICAL EXAMINATION:  General:  No acute distress, awake, alert and oriented  Skin:  Warm and dry, no visible rash  HEENT:  Normocephalic/atraumatic.  Chest:  Normal respiratory effort  Extremities:  No visible clubbing, cyanosis, or edema  Neuro/psych:  Grossly nonfocal.  Normal mood and affect.     DIAGNOSTIC DATA:  CBC & Differential (04/16/2025 08:25)  Retic With IRF & RET-He (04/16/2025 08:25)  Iron Profile (04/16/2025 08:25)  Ferritin (04/16/2025 08:25)    IMAGING:  None reviewed    ASSESSMENT:  This is a 70 y.o. male with:    *Thrombocytopenia  This has been mild and chronic.  I did not see any evidence for platelet clumping on his peripheral blood smear.  He has been asymptomatic without bleeding.  His IPF was slightly elevated at 8.7%.  Vitamin B12 was normal at 304.     Platelets 124,000, slightly improved    *Hereditary hemochromatosis:    He is a homozygote for the HFE gene C282Y mutation.    Baseline ferritin in the office on 8/14/2019 is 929.  Ferritin slowly improved with phlebotomy about every 2 to 3 weeks.    AFP normal at 2.17 on 2/18/2021  With aggressive phlebotomy, ferritin finally improved with a " ferritin of 39.0 as of 6/7 and 66.5 as of 7/6/21.  6/11/2024: Ferritin 121, from 158, from 160.  Iron 209 with 86% iron saturation.  Ferritin 119, from 124      PLAN:  He does require therapeutic phlebotomy today  Schedule labs, therapeutic phlebotomy in approximately 3 months and I will see him back in 6 months with the same  Ultimately, goal ferritin less than 100.

## 2025-04-16 ENCOUNTER — INFUSION (OUTPATIENT)
Dept: ONCOLOGY | Facility: HOSPITAL | Age: 70
End: 2025-04-16
Payer: MEDICARE

## 2025-04-16 ENCOUNTER — LAB (OUTPATIENT)
Dept: LAB | Facility: HOSPITAL | Age: 70
End: 2025-04-16
Payer: MEDICARE

## 2025-04-16 ENCOUNTER — OFFICE VISIT (OUTPATIENT)
Dept: ONCOLOGY | Facility: CLINIC | Age: 70
End: 2025-04-16
Payer: MEDICARE

## 2025-04-16 VITALS
HEART RATE: 63 BPM | OXYGEN SATURATION: 96 % | BODY MASS INDEX: 20.32 KG/M2 | TEMPERATURE: 97.7 F | RESPIRATION RATE: 16 BRPM | HEIGHT: 76 IN | SYSTOLIC BLOOD PRESSURE: 146 MMHG | DIASTOLIC BLOOD PRESSURE: 96 MMHG | WEIGHT: 166.9 LBS

## 2025-04-16 DIAGNOSIS — E83.110 HEREDITARY HEMOCHROMATOSIS: Primary | ICD-10-CM

## 2025-04-16 DIAGNOSIS — D69.6 THROMBOCYTOPENIA: ICD-10-CM

## 2025-04-16 DIAGNOSIS — E83.110 HEREDITARY HEMOCHROMATOSIS: ICD-10-CM

## 2025-04-16 LAB
BASOPHILS # BLD AUTO: 0.08 10*3/MM3 (ref 0–0.2)
BASOPHILS NFR BLD AUTO: 1.8 % (ref 0–1.5)
DEPRECATED RDW RBC AUTO: 47.6 FL (ref 37–54)
EOSINOPHIL # BLD AUTO: 0.43 10*3/MM3 (ref 0–0.4)
EOSINOPHIL NFR BLD AUTO: 9.7 % (ref 0.3–6.2)
ERYTHROCYTE [DISTWIDTH] IN BLOOD BY AUTOMATED COUNT: 13 % (ref 12.3–15.4)
FERRITIN SERPL-MCNC: 119 NG/ML (ref 30–400)
HCT VFR BLD AUTO: 49.3 % (ref 37.5–51)
HGB BLD-MCNC: 16 G/DL (ref 13–17.7)
HGB RETIC QN AUTO: 37.3 PG (ref 29.8–36.1)
IMM GRANULOCYTES # BLD AUTO: 0.03 10*3/MM3 (ref 0–0.05)
IMM GRANULOCYTES NFR BLD AUTO: 0.7 % (ref 0–0.5)
IMM RETICS NFR: 3.6 % (ref 3–15.8)
IRON 24H UR-MRATE: 140 MCG/DL (ref 59–158)
IRON SATN MFR SERPL: 53 % (ref 20–50)
LYMPHOCYTES # BLD AUTO: 1.21 10*3/MM3 (ref 0.7–3.1)
LYMPHOCYTES NFR BLD AUTO: 27.4 % (ref 19.6–45.3)
MCH RBC QN AUTO: 32.1 PG (ref 26.6–33)
MCHC RBC AUTO-ENTMCNC: 32.5 G/DL (ref 31.5–35.7)
MCV RBC AUTO: 99 FL (ref 79–97)
MONOCYTES # BLD AUTO: 0.57 10*3/MM3 (ref 0.1–0.9)
MONOCYTES NFR BLD AUTO: 12.9 % (ref 5–12)
NEUTROPHILS NFR BLD AUTO: 2.1 10*3/MM3 (ref 1.7–7)
NEUTROPHILS NFR BLD AUTO: 47.5 % (ref 42.7–76)
NRBC BLD AUTO-RTO: 0 /100 WBC (ref 0–0.2)
PLATELET # BLD AUTO: 124 10*3/MM3 (ref 140–450)
PMV BLD AUTO: 10.6 FL (ref 6–12)
RBC # BLD AUTO: 4.98 10*6/MM3 (ref 4.14–5.8)
RETICS # AUTO: 0.05 10*6/MM3 (ref 0.02–0.13)
RETICS/RBC NFR AUTO: 1.09 % (ref 0.7–1.9)
TIBC SERPL-MCNC: 262 MCG/DL (ref 298–536)
TRANSFERRIN SERPL-MCNC: 176 MG/DL (ref 200–360)
WBC NRBC COR # BLD AUTO: 4.42 10*3/MM3 (ref 3.4–10.8)

## 2025-04-16 PROCEDURE — 84466 ASSAY OF TRANSFERRIN: CPT

## 2025-04-16 PROCEDURE — 85025 COMPLETE CBC W/AUTO DIFF WBC: CPT

## 2025-04-16 PROCEDURE — 85046 RETICYTE/HGB CONCENTRATE: CPT

## 2025-04-16 PROCEDURE — 82728 ASSAY OF FERRITIN: CPT

## 2025-04-16 PROCEDURE — 83540 ASSAY OF IRON: CPT

## 2025-04-16 PROCEDURE — 99195 PHLEBOTOMY: CPT

## 2025-04-16 PROCEDURE — 36415 COLL VENOUS BLD VENIPUNCTURE: CPT

## 2025-07-09 ENCOUNTER — LAB (OUTPATIENT)
Dept: LAB | Facility: HOSPITAL | Age: 70
End: 2025-07-09
Payer: MEDICARE

## 2025-07-09 ENCOUNTER — INFUSION (OUTPATIENT)
Dept: ONCOLOGY | Facility: HOSPITAL | Age: 70
End: 2025-07-09
Payer: MEDICARE

## 2025-07-09 VITALS
TEMPERATURE: 97.7 F | SYSTOLIC BLOOD PRESSURE: 106 MMHG | DIASTOLIC BLOOD PRESSURE: 72 MMHG | RESPIRATION RATE: 16 BRPM | OXYGEN SATURATION: 96 % | BODY MASS INDEX: 19.61 KG/M2 | HEART RATE: 71 BPM | WEIGHT: 161 LBS

## 2025-07-09 DIAGNOSIS — E83.110 HEREDITARY HEMOCHROMATOSIS: Primary | ICD-10-CM

## 2025-07-09 DIAGNOSIS — E83.110 HEREDITARY HEMOCHROMATOSIS: ICD-10-CM

## 2025-07-09 LAB
BASOPHILS # BLD AUTO: 0.08 10*3/MM3 (ref 0–0.2)
BASOPHILS NFR BLD AUTO: 1.7 % (ref 0–1.5)
DEPRECATED RDW RBC AUTO: 45.8 FL (ref 37–54)
EOSINOPHIL # BLD AUTO: 0.38 10*3/MM3 (ref 0–0.4)
EOSINOPHIL NFR BLD AUTO: 8 % (ref 0.3–6.2)
ERYTHROCYTE [DISTWIDTH] IN BLOOD BY AUTOMATED COUNT: 12.5 % (ref 12.3–15.4)
FERRITIN SERPL-MCNC: 99.1 NG/ML (ref 30–400)
HCT VFR BLD AUTO: 47.2 % (ref 37.5–51)
HGB BLD-MCNC: 15.5 G/DL (ref 13–17.7)
IMM GRANULOCYTES # BLD AUTO: 0.01 10*3/MM3 (ref 0–0.05)
IMM GRANULOCYTES NFR BLD AUTO: 0.2 % (ref 0–0.5)
LYMPHOCYTES # BLD AUTO: 1.31 10*3/MM3 (ref 0.7–3.1)
LYMPHOCYTES NFR BLD AUTO: 27.7 % (ref 19.6–45.3)
MCH RBC QN AUTO: 32.4 PG (ref 26.6–33)
MCHC RBC AUTO-ENTMCNC: 32.8 G/DL (ref 31.5–35.7)
MCV RBC AUTO: 98.7 FL (ref 79–97)
MONOCYTES # BLD AUTO: 0.44 10*3/MM3 (ref 0.1–0.9)
MONOCYTES NFR BLD AUTO: 9.3 % (ref 5–12)
NEUTROPHILS NFR BLD AUTO: 2.51 10*3/MM3 (ref 1.7–7)
NEUTROPHILS NFR BLD AUTO: 53.1 % (ref 42.7–76)
NRBC BLD AUTO-RTO: 0 /100 WBC (ref 0–0.2)
PLATELET # BLD AUTO: 126 10*3/MM3 (ref 140–450)
PMV BLD AUTO: 10.8 FL (ref 6–12)
RBC # BLD AUTO: 4.78 10*6/MM3 (ref 4.14–5.8)
WBC NRBC COR # BLD AUTO: 4.73 10*3/MM3 (ref 3.4–10.8)

## 2025-07-09 PROCEDURE — 85025 COMPLETE CBC W/AUTO DIFF WBC: CPT

## 2025-07-09 PROCEDURE — 82728 ASSAY OF FERRITIN: CPT

## 2025-07-09 PROCEDURE — 99195 PHLEBOTOMY: CPT

## 2025-07-09 PROCEDURE — 36415 COLL VENOUS BLD VENIPUNCTURE: CPT

## 2025-07-09 RX ORDER — SODIUM CHLORIDE 9 MG/ML
250 INJECTION, SOLUTION INTRAVENOUS ONCE
OUTPATIENT
Start: 2025-07-09

## 2025-08-19 ENCOUNTER — TRANSCRIBE ORDERS (OUTPATIENT)
Dept: LAB | Facility: HOSPITAL | Age: 70
End: 2025-08-19
Payer: MEDICARE

## 2025-08-19 ENCOUNTER — LAB (OUTPATIENT)
Dept: LAB | Facility: HOSPITAL | Age: 70
End: 2025-08-19
Payer: MEDICARE

## 2025-08-19 DIAGNOSIS — R73.01 ELEVATED FASTING GLUCOSE: ICD-10-CM

## 2025-08-19 DIAGNOSIS — I10 BENIGN HYPERTENSION: ICD-10-CM

## 2025-08-19 DIAGNOSIS — Z00.00 ROUTINE MEDICAL EXAM: ICD-10-CM

## 2025-08-19 DIAGNOSIS — I25.10 CORONARY ARTERY DISEASE, OCCLUSIVE: ICD-10-CM

## 2025-08-19 DIAGNOSIS — Z12.5 PROSTATE CANCER SCREENING: ICD-10-CM

## 2025-08-19 DIAGNOSIS — D69.6 THROMBOCYTOPENIA: Primary | ICD-10-CM

## 2025-08-19 DIAGNOSIS — D69.6 THROMBOCYTOPENIA: ICD-10-CM

## 2025-08-19 LAB
25(OH)D3 SERPL-MCNC: 37.5 NG/ML (ref 30–100)
ALBUMIN SERPL-MCNC: 4.3 G/DL (ref 3.5–5.2)
ALBUMIN/GLOB SERPL: 1.7 G/DL
ALP SERPL-CCNC: 80 U/L (ref 39–117)
ALT SERPL W P-5'-P-CCNC: 17 U/L (ref 1–41)
ANION GAP SERPL CALCULATED.3IONS-SCNC: 13 MMOL/L (ref 5–15)
AST SERPL-CCNC: 25 U/L (ref 1–40)
BASOPHILS # BLD AUTO: 0.09 10*3/MM3 (ref 0–0.2)
BASOPHILS NFR BLD AUTO: 2 % (ref 0–1.5)
BILIRUB SERPL-MCNC: 1.2 MG/DL (ref 0–1.2)
BILIRUB UR QL STRIP: NEGATIVE
BUN SERPL-MCNC: 22 MG/DL (ref 8–23)
BUN/CREAT SERPL: 23.2 (ref 7–25)
CALCIUM SPEC-SCNC: 9.3 MG/DL (ref 8.6–10.5)
CHLORIDE SERPL-SCNC: 103 MMOL/L (ref 98–107)
CHOLEST SERPL-MCNC: 122 MG/DL (ref 0–200)
CLARITY UR: CLEAR
CO2 SERPL-SCNC: 24 MMOL/L (ref 22–29)
COLOR UR: YELLOW
CREAT SERPL-MCNC: 0.95 MG/DL (ref 0.76–1.27)
DEPRECATED RDW RBC AUTO: 46 FL (ref 37–54)
EGFRCR SERPLBLD CKD-EPI 2021: 85.6 ML/MIN/1.73
EOSINOPHIL # BLD AUTO: 0.39 10*3/MM3 (ref 0–0.4)
EOSINOPHIL NFR BLD AUTO: 8.7 % (ref 0.3–6.2)
ERYTHROCYTE [DISTWIDTH] IN BLOOD BY AUTOMATED COUNT: 12.1 % (ref 12.3–15.4)
GLOBULIN UR ELPH-MCNC: 2.5 GM/DL
GLUCOSE SERPL-MCNC: 99 MG/DL (ref 65–99)
GLUCOSE UR STRIP-MCNC: NEGATIVE MG/DL
HBA1C MFR BLD: 5.3 % (ref 4.8–5.6)
HCT VFR BLD AUTO: 48.5 % (ref 37.5–51)
HDLC SERPL-MCNC: 41 MG/DL (ref 40–60)
HGB BLD-MCNC: 15.8 G/DL (ref 13–17.7)
HGB UR QL STRIP.AUTO: NEGATIVE
IMM GRANULOCYTES # BLD AUTO: 0.01 10*3/MM3 (ref 0–0.05)
IMM GRANULOCYTES NFR BLD AUTO: 0.2 % (ref 0–0.5)
KETONES UR QL STRIP: NEGATIVE
LDLC SERPL CALC-MCNC: 70 MG/DL (ref 0–100)
LDLC/HDLC SERPL: 1.74 {RATIO}
LEUKOCYTE ESTERASE UR QL STRIP.AUTO: NEGATIVE
LYMPHOCYTES # BLD AUTO: 1.29 10*3/MM3 (ref 0.7–3.1)
LYMPHOCYTES NFR BLD AUTO: 28.7 % (ref 19.6–45.3)
MCH RBC QN AUTO: 33.2 PG (ref 26.6–33)
MCHC RBC AUTO-ENTMCNC: 32.6 G/DL (ref 31.5–35.7)
MCV RBC AUTO: 101.9 FL (ref 79–97)
MONOCYTES # BLD AUTO: 0.51 10*3/MM3 (ref 0.1–0.9)
MONOCYTES NFR BLD AUTO: 11.3 % (ref 5–12)
NEUTROPHILS NFR BLD AUTO: 2.21 10*3/MM3 (ref 1.7–7)
NEUTROPHILS NFR BLD AUTO: 49.1 % (ref 42.7–76)
NITRITE UR QL STRIP: NEGATIVE
PH UR STRIP.AUTO: 5.5 [PH] (ref 5–8)
PLATELET # BLD AUTO: 126 10*3/MM3 (ref 140–450)
PMV BLD AUTO: 11.1 FL (ref 6–12)
POTASSIUM SERPL-SCNC: 4.4 MMOL/L (ref 3.5–5.2)
PROT SERPL-MCNC: 6.8 G/DL (ref 6–8.5)
PROT UR QL STRIP: ABNORMAL
PSA SERPL-MCNC: 1.02 NG/ML (ref 0–4)
RBC # BLD AUTO: 4.76 10*6/MM3 (ref 4.14–5.8)
SODIUM SERPL-SCNC: 140 MMOL/L (ref 136–145)
SP GR UR STRIP: 1.02 (ref 1–1.03)
TRIGL SERPL-MCNC: 49 MG/DL (ref 0–150)
TSH SERPL DL<=0.05 MIU/L-ACNC: 1.16 UIU/ML (ref 0.27–4.2)
UROBILINOGEN UR QL STRIP: ABNORMAL
VLDLC SERPL-MCNC: 11 MG/DL (ref 5–40)
WBC NRBC COR # BLD AUTO: 4.5 10*3/MM3 (ref 3.4–10.8)

## 2025-08-19 PROCEDURE — 85025 COMPLETE CBC W/AUTO DIFF WBC: CPT

## 2025-08-19 PROCEDURE — 82306 VITAMIN D 25 HYDROXY: CPT

## 2025-08-19 PROCEDURE — 84443 ASSAY THYROID STIM HORMONE: CPT

## 2025-08-19 PROCEDURE — 36415 COLL VENOUS BLD VENIPUNCTURE: CPT

## 2025-08-19 PROCEDURE — 80061 LIPID PANEL: CPT

## 2025-08-19 PROCEDURE — 80053 COMPREHEN METABOLIC PANEL: CPT

## 2025-08-19 PROCEDURE — 83036 HEMOGLOBIN GLYCOSYLATED A1C: CPT

## 2025-08-19 PROCEDURE — 84153 ASSAY OF PSA TOTAL: CPT

## 2025-08-19 PROCEDURE — 81003 URINALYSIS AUTO W/O SCOPE: CPT

## 2025-08-27 ENCOUNTER — TRANSCRIBE ORDERS (OUTPATIENT)
Dept: ADMINISTRATIVE | Facility: HOSPITAL | Age: 70
End: 2025-08-27
Payer: MEDICARE

## 2025-08-27 DIAGNOSIS — I71.21 ASCENDING AORTIC ANEURYSM, UNSPECIFIED WHETHER RUPTURED: Primary | ICD-10-CM

## (undated) DEVICE — SENSR O2 OXIMAX FNGR A/ 18IN NONSTR

## (undated) DEVICE — FRCP BX RADJAW4 NDL 2.8 240CM LG OG BX80

## (undated) DEVICE — ADAPT CLN BIOGUARD AIR/H2O DISP

## (undated) DEVICE — CANN O2 ETCO2 FITS ALL CONN CO2 SMPL A/ 7IN DISP LF

## (undated) DEVICE — KT ORCA ORCAPOD DISP STRL

## (undated) DEVICE — TBG SXN CONN/F UNIV 1/4IN 10FT LF STRL

## (undated) DEVICE — LN SMPL CO2 SHTRM SD STREAM W/M LUER